# Patient Record
Sex: MALE | Race: WHITE | NOT HISPANIC OR LATINO | RURAL
[De-identification: names, ages, dates, MRNs, and addresses within clinical notes are randomized per-mention and may not be internally consistent; named-entity substitution may affect disease eponyms.]

---

## 2023-11-03 ENCOUNTER — HOSPITAL ENCOUNTER (EMERGENCY)
Facility: HOSPITAL | Age: 63
Discharge: HOME | End: 2023-11-03

## 2023-11-03 VITALS
HEIGHT: 72 IN | OXYGEN SATURATION: 95 % | BODY MASS INDEX: 24.11 KG/M2 | HEART RATE: 63 BPM | WEIGHT: 178 LBS | TEMPERATURE: 98 F | SYSTOLIC BLOOD PRESSURE: 129 MMHG | RESPIRATION RATE: 18 BRPM | DIASTOLIC BLOOD PRESSURE: 82 MMHG

## 2023-11-03 DIAGNOSIS — R05.9 COUGH, UNSPECIFIED TYPE: ICD-10-CM

## 2023-11-03 DIAGNOSIS — J40 BRONCHITIS: Primary | ICD-10-CM

## 2023-11-03 LAB
FLUAV AG UPPER RESP QL IA.RAPID: NEGATIVE
FLUBV AG UPPER RESP QL IA.RAPID: NEGATIVE
SARS-COV-2 RDRP RESP QL NAA+PROBE: NEGATIVE

## 2023-11-03 PROCEDURE — 99284 PR EMERGENCY DEPT VISIT,LEVEL IV: ICD-10-PCS | Mod: ,,,

## 2023-11-03 PROCEDURE — 99284 EMERGENCY DEPT VISIT MOD MDM: CPT | Mod: ,,,

## 2023-11-03 PROCEDURE — 87804 INFLUENZA ASSAY W/OPTIC: CPT

## 2023-11-03 PROCEDURE — 96372 THER/PROPH/DIAG INJ SC/IM: CPT

## 2023-11-03 PROCEDURE — 87635 SARS-COV-2 COVID-19 AMP PRB: CPT

## 2023-11-03 PROCEDURE — 99284 EMERGENCY DEPT VISIT MOD MDM: CPT

## 2023-11-03 PROCEDURE — 63600175 PHARM REV CODE 636 W HCPCS

## 2023-11-03 RX ORDER — CEFTRIAXONE 1 G/1
1 INJECTION, POWDER, FOR SOLUTION INTRAMUSCULAR; INTRAVENOUS
Status: COMPLETED | OUTPATIENT
Start: 2023-11-03 | End: 2023-11-03

## 2023-11-03 RX ORDER — AZITHROMYCIN 250 MG/1
TABLET, FILM COATED ORAL
Qty: 6 TABLET | Refills: 0 | Status: SHIPPED | OUTPATIENT
Start: 2023-11-03 | End: 2023-11-08

## 2023-11-03 RX ORDER — DILTIAZEM HYDROCHLORIDE 180 MG/1
180 CAPSULE, COATED, EXTENDED RELEASE ORAL DAILY
COMMUNITY

## 2023-11-03 RX ORDER — DIGOXIN 125 MCG
125 TABLET ORAL DAILY
COMMUNITY

## 2023-11-03 RX ADMIN — CEFTRIAXONE SODIUM 1 G: 1 INJECTION, POWDER, FOR SOLUTION INTRAMUSCULAR; INTRAVENOUS at 06:11

## 2023-11-03 NOTE — ED PROVIDER NOTES
Encounter Date: 11/3/2023       History     Chief Complaint   Patient presents with    Nasal Congestion    Cough     X 4 days     Patient is a 63-year-old white male who presents to the emergency department with cough and nasal congestion over the past 4 days.  He reports a concern that he may have been exposed to influenza.  He is also a smoker.  He is having a productive cough.  He is tolerating p.o. well.  He does have known COPD but denies any shortness of breath or difficulty breathing.  He has been treating with over-the-counter nasal saline spray and reports that he did have a nosebleed 1-2 days prior.  He is resting comfortably at the time of the exam.  He denies any chest pain, shortness of breath, weakness, or any other complaints at this time.  His vital signs are within normal limits.  He appears in no acute distress.    The history is provided by the patient.     Review of patient's allergies indicates:  No Known Allergies  Past Medical History:   Diagnosis Date    Hypertension      History reviewed. No pertinent surgical history.  History reviewed. No pertinent family history.  Social History     Tobacco Use    Smoking status: Never    Smokeless tobacco: Never   Substance Use Topics    Alcohol use: Yes    Drug use: Never     Review of Systems   Constitutional:  Negative for activity change, appetite change, chills and fever.   HENT:  Positive for congestion. Negative for drooling, ear discharge, ear pain, facial swelling, sinus pressure, sinus pain, sore throat and trouble swallowing.    Respiratory:  Positive for cough (productive). Negative for shortness of breath.    Cardiovascular:  Negative for chest pain and palpitations.   Gastrointestinal:  Negative for abdominal pain, diarrhea, nausea and vomiting.   Genitourinary:  Negative for dysuria and frequency.   Musculoskeletal:  Negative for arthralgias, back pain, myalgias, neck pain and neck stiffness.   Skin:  Negative for color change, pallor and  rash.   Neurological:  Negative for dizziness, seizures, syncope, facial asymmetry, weakness, light-headedness, numbness and headaches.   Psychiatric/Behavioral:  Negative for confusion. The patient is not nervous/anxious.    All other systems reviewed and are negative.      Physical Exam     Initial Vitals [11/03/23 1804]   BP Pulse Resp Temp SpO2   129/82 63 18 98.1 °F (36.7 °C) 95 %      MAP       --         Physical Exam    Nursing note and vitals reviewed.  Constitutional: He appears well-developed and well-nourished. No distress.   HENT:   Head: Normocephalic and atraumatic.   Mouth/Throat: Oropharynx is clear and moist.   Eyes: Conjunctivae and EOM are normal. Pupils are equal, round, and reactive to light.   Neck: Neck supple.   Normal range of motion.  Cardiovascular:  Normal rate, regular rhythm and normal heart sounds.           Pulmonary/Chest: Effort normal. No respiratory distress. He has no decreased breath sounds. He has no wheezes. He has rhonchi (mild and bilateral). He has no rales.   Abdominal: Abdomen is soft. Bowel sounds are normal. He exhibits no distension. There is no abdominal tenderness.   Musculoskeletal:         General: Normal range of motion.      Cervical back: Normal range of motion and neck supple.     Neurological: He is alert and oriented to person, place, and time. He has normal strength. GCS score is 15. GCS eye subscore is 4. GCS verbal subscore is 5. GCS motor subscore is 6.   Skin: Skin is warm and dry. Capillary refill takes less than 2 seconds.   Psychiatric: He has a normal mood and affect. His behavior is normal. Judgment and thought content normal.         Medical Screening Exam   See Full Note    ED Course   Procedures  Labs Reviewed   RAPID INFLUENZA A/B - Normal   SARS-COV-2 RNA AMPLIFICATION, QUAL - Normal    Narrative:     Negative SARS-CoV results should not be used as the sole basis for treatment or patient management decisions; negative results should be  considered in the context of a patient's recent exposures, history and the presene of clinical signs and symptoms consistent with COVID-19.  Negative results should be treated as presumptive and confirmed by molecular assay, if necessary for patient management.          Imaging Results    None          Medications   cefTRIAXone injection 1 g (has no administration in time range)     Medical Decision Making  Patient presents for evaluation of cough and congestion over the past 4 days.  He has a smoker.  He is not in any respiratory distress.  He is afebrile.  History and exam is consistent with bronchitis.  We will treat the patient with Rocephin 1 g IM while in the emergency department and place the patient on a Z-Wilian outpatient.  He was instructed to return to the emergency department for any new or worrisome symptoms.  He was also advised to follow up with his primary care doctor on Monday for a recheck.  He verbalizes understanding and is agreement with this plan.    Amount and/or Complexity of Data Reviewed  Independent Historian:      Details: Patient is a 63-year-old white male who presents to the emergency department with cough and nasal congestion over the past 4 days.  He reports a concern that he may have been exposed to influenza.  He is also a smoker.  He is having a productive cough.  He is tolerating p.o. well.  He does have known COPD but denies any shortness of breath or difficulty breathing.  He has been treating with over-the-counter nasal saline spray and reports that he did have a nosebleed 1-2 days prior.  He is resting comfortably at the time of the exam.  He denies any chest pain, shortness of breath, weakness, or any other complaints at this time.  His vital signs are within normal limits.  He appears in no acute distress.    Risk  Prescription drug management.  Risk Details: The presentation of Harris DASIA Miko is consistent with acute bronchitis. More serious diseases of the lower respiratory  tract were considered but in the absence of clinical or ancillary findings highly suggestive of such, these conditions were considered unlikely. Such diseases include but are not limited to pneumonia, asthma (extrinsic or intrinsic), influenza, retained foreign body, or occupational exposures. Other causes of cough such as GERD, pharyngitis, and sinusitis were also felt to be unlikely.    Upon discharge, Steven Crouch has no evidence of respiratory failure and is comfortable without respiratory distress. Additionally, Steven Crouch has no evidence of airway compromise and is speaking in full/complete sentences without difficulty. Steven Crouch meets outpatient treatment criteria.    Data Reviewed/Counseling: I have reviewed the patient's vital signs, nursing notes, and other relevant ancillary testing/information. I have had a detailed discussion with the patient regarding the historical points, examination findings, and any diagnostic results. I have also discussed the need for outpatient follow-up. I have recommended symptomatic therapy directed at alleviating symptoms as care for acute bronchitis is primarily supportive.  Due to the patient's advanced age, comorbidities, and current smoking status we will treat him with a dose of outpatient Z-Wilian.  It was also encouraged to follow up with his primary care provider on Monday for a recheck and return to the emergency department for any new or worrisome symptoms.  He verbalizes understanding and agreement with this plan.    Steven Crouch has been counseled to return to the Emergency Department if symptoms worsen or if there are any questions or concerns that arise while at home.    Steven Crouch encouraged to practice good infection control procedures to include but not limited to frequent hand washing to lessen likelihood of transmission of this infection.     Dx:  Bronchitis/cough                               Clinical Impression:   Final diagnoses:  [J40]  Bronchitis (Primary)  [R05.9] Cough, unspecified type        ED Disposition Condition    Discharge Stable          ED Prescriptions       Medication Sig Dispense Start Date End Date Auth. Provider    azithromycin (Z-LYUBOV) 250 MG tablet Take 2 tablets by mouth on day 1; Take 1 tablet by mouth on days 2-5 6 tablet 11/3/2023 11/8/2023 Abdoul Reina FNP          Follow-up Information    None          Abdoul Reina FNP  11/03/23 0304

## 2023-11-03 NOTE — DISCHARGE INSTRUCTIONS
Alternate Tylenol and ibuprofen over-the-counter for any pain or fever.    Increase fluids to help thin mucus secretions and maintain proper hydration.    Take all antibiotics even if you feel better.  Arrange for a follow up with your primary care provider in 1-2 days for a recheck.    Return to the emergency department for any new or worrisome symptoms.

## 2023-11-08 NOTE — ADDENDUM NOTE
Encounter addended by: Sanaz Ramírez on: 11/8/2023 6:55 AM   Actions taken: SmartForm saved, Flowsheet accepted

## 2023-12-20 DIAGNOSIS — M25.469 EFFUSION, UNSPECIFIED KNEE: Primary | ICD-10-CM

## 2023-12-26 DIAGNOSIS — M25.561 RIGHT KNEE PAIN, UNSPECIFIED CHRONICITY: Primary | ICD-10-CM

## 2024-01-03 ENCOUNTER — OFFICE VISIT (OUTPATIENT)
Dept: ORTHOPEDICS | Facility: CLINIC | Age: 64
End: 2024-01-03
Payer: OTHER GOVERNMENT

## 2024-01-03 ENCOUNTER — HOSPITAL ENCOUNTER (OUTPATIENT)
Dept: RADIOLOGY | Facility: HOSPITAL | Age: 64
Discharge: HOME | End: 2024-01-03
Attending: ORTHOPAEDIC SURGERY
Payer: OTHER GOVERNMENT

## 2024-01-03 DIAGNOSIS — M25.469 EFFUSION, UNSPECIFIED KNEE: ICD-10-CM

## 2024-01-03 DIAGNOSIS — M25.561 RIGHT KNEE PAIN, UNSPECIFIED CHRONICITY: ICD-10-CM

## 2024-01-03 DIAGNOSIS — M25.561 RIGHT KNEE PAIN, UNSPECIFIED CHRONICITY: Primary | ICD-10-CM

## 2024-01-03 PROCEDURE — 99999PBSHW PR PBB SHADOW TECHNICAL ONLY FILED TO HB: Mod: PBBFAC,,,

## 2024-01-03 PROCEDURE — 20610 DRAIN/INJ JOINT/BURSA W/O US: CPT | Mod: PBBFAC | Performed by: ORTHOPAEDIC SURGERY

## 2024-01-03 PROCEDURE — 99212 OFFICE O/P EST SF 10 MIN: CPT | Mod: PBBFAC | Performed by: ORTHOPAEDIC SURGERY

## 2024-01-03 PROCEDURE — 73562 X-RAY EXAM OF KNEE 3: CPT | Mod: TC,RT

## 2024-01-03 PROCEDURE — 99204 OFFICE O/P NEW MOD 45 MIN: CPT | Mod: S$PBB,25,, | Performed by: ORTHOPAEDIC SURGERY

## 2024-01-03 PROCEDURE — 73562 X-RAY EXAM OF KNEE 3: CPT | Mod: 26,RT,, | Performed by: ORTHOPAEDIC SURGERY

## 2024-01-03 PROCEDURE — 20610 DRAIN/INJ JOINT/BURSA W/O US: CPT | Mod: S$PBB,RT,, | Performed by: ORTHOPAEDIC SURGERY

## 2024-01-03 RX ORDER — BUPIVACAINE HYDROCHLORIDE 2.5 MG/ML
1 INJECTION, SOLUTION INFILTRATION; PERINEURAL
Status: COMPLETED | OUTPATIENT
Start: 2024-01-03 | End: 2024-01-03

## 2024-01-03 RX ORDER — MELOXICAM 15 MG/1
15 TABLET ORAL DAILY
Qty: 30 TABLET | Refills: 2 | Status: SHIPPED | OUTPATIENT
Start: 2024-01-03 | End: 2024-01-08

## 2024-01-03 RX ORDER — TRIAMCINOLONE ACETONIDE 40 MG/ML
40 INJECTION, SUSPENSION INTRA-ARTICULAR; INTRAMUSCULAR
Status: COMPLETED | OUTPATIENT
Start: 2024-01-03 | End: 2024-01-03

## 2024-01-03 RX ADMIN — TRIAMCINOLONE ACETONIDE 40 MG: 40 INJECTION, SUSPENSION INTRA-ARTICULAR; INTRAMUSCULAR at 01:01

## 2024-01-03 RX ADMIN — BUPIVACAINE HYDROCHLORIDE 2.5 MG: 2.5 INJECTION, SOLUTION INFILTRATION; PERINEURAL at 01:01

## 2024-01-03 NOTE — PROGRESS NOTES
CLINIC NOTE       Chief Complaint   Patient presents with    Right Knee - Pain        Setven Crouch is a 63 y.o. male seen today for evaluation of right knee pain.  Symptoms began insidiously.  He was had escalation of symptoms over the past 2 months.  Had an awkward twisting moment after getting off of a 4 rodriguez that exacerbated symptoms involving the anterior compartment.  He has been aware of subpatellar crepitance.  He has not using any oral anti-inflammatory medications.  He has never had intra-articular steroid injection.  [facility]  He was not on any blood thinners.  He resides in [facility]      Past Medical History:   Diagnosis Date    Hypertension      No family history on file.  Current Outpatient Medications on File Prior to Visit   Medication Sig Dispense Refill    digoxin (LANOXIN) 125 mcg tablet Take 125 mcg by mouth once daily.      diltiaZEM (CARDIZEM CD) 180 MG 24 hr capsule Take 180 mg by mouth once daily.       No current facility-administered medications on file prior to visit.       ROS     There were no vitals filed for this visit.    No past surgical history on file.     Review of patient's allergies indicates:  No Known Allergies     Ortho Exam :  Well-developed well-nourished  male no acute distress.  Alert oriented cooperative.  Neck is supple without JVD.  Breathing is regular nonlabored.  Skin is warm dry no lesions seen.  Exam of the right knee shows minimal intra-articular effusion.  Knee range motion is 0-125 degrees of flexion.  There is significant subpatellar crepitance with active knee extension.  Patella appears to track laterally.  Knee ligaments stable clinically.    Radiographic Examination:  Right knee 01/03/2024    Technique:  Three views AP lateral and patellar    Findings:  Bones well mineralized.  There is evidence of tricompartmental DJD with the greatest involvement of the patellofemoral joint.  The patella  was tracking laterally on sunrise view.  There is moderate narrowing of the medial and lateral joint spaces with early marginal osteophyte formation.    Impression:   See Above    Assessment and Plan  There are no problems to display for this patient.  Impression:  Moderately severe tricompartmental DJD-right knee  Plan: The right knee was prepped with Betadine intra-articular steroid injection performed with triamcinolone and Marcaine 1 cc each.  Rx Mobic 15 mg 1 p.o. q.d. p.r.n..  We discussed the option for total knee replacement arthroplasty in the future if symptoms persist at an unacceptable level.  He would need to wait 3-4 months following steroid injection before he will be eligible to schedule.  Jed Gonzalez M.D.

## 2024-01-08 ENCOUNTER — TELEPHONE (OUTPATIENT)
Dept: ORTHOPEDICS | Facility: CLINIC | Age: 64
End: 2024-01-08
Payer: OTHER GOVERNMENT

## 2024-01-08 ENCOUNTER — HOSPITAL ENCOUNTER (EMERGENCY)
Facility: HOSPITAL | Age: 64
Discharge: HOME | End: 2024-01-08
Payer: OTHER GOVERNMENT

## 2024-01-08 VITALS
OXYGEN SATURATION: 99 % | SYSTOLIC BLOOD PRESSURE: 118 MMHG | DIASTOLIC BLOOD PRESSURE: 76 MMHG | RESPIRATION RATE: 16 BRPM | HEIGHT: 71 IN | WEIGHT: 165 LBS | HEART RATE: 70 BPM | TEMPERATURE: 98 F | BODY MASS INDEX: 23.1 KG/M2

## 2024-01-08 DIAGNOSIS — K76.9 CHRONIC LIVER DISEASE AND CIRRHOSIS: Primary | ICD-10-CM

## 2024-01-08 DIAGNOSIS — K74.60 CHRONIC LIVER DISEASE AND CIRRHOSIS: Primary | ICD-10-CM

## 2024-01-08 DIAGNOSIS — K59.00 CONSTIPATION, UNSPECIFIED CONSTIPATION TYPE: ICD-10-CM

## 2024-01-08 DIAGNOSIS — R10.9 ABDOMINAL PAIN: ICD-10-CM

## 2024-01-08 LAB
ALBUMIN SERPL BCP-MCNC: 2.7 G/DL (ref 3.5–5)
ALBUMIN/GLOB SERPL: 0.5 {RATIO}
ALP SERPL-CCNC: 106 U/L (ref 45–115)
ALT SERPL W P-5'-P-CCNC: 62 U/L (ref 16–61)
AMYLASE SERPL-CCNC: 25 U/L (ref 25–115)
ANION GAP SERPL CALCULATED.3IONS-SCNC: 11 MMOL/L (ref 7–16)
AST SERPL W P-5'-P-CCNC: 50 U/L (ref 15–37)
BASOPHILS # BLD AUTO: 0.05 K/UL (ref 0–0.2)
BASOPHILS NFR BLD AUTO: 0.6 % (ref 0–1)
BILIRUB SERPL-MCNC: 2 MG/DL (ref ?–1.2)
BILIRUB UR QL STRIP: NEGATIVE
BUN SERPL-MCNC: 20 MG/DL (ref 7–18)
BUN/CREAT SERPL: 19 (ref 6–20)
CALCIUM SERPL-MCNC: 8.7 MG/DL (ref 8.5–10.1)
CHLORIDE SERPL-SCNC: 105 MMOL/L (ref 98–107)
CLARITY UR: CLEAR
CO2 SERPL-SCNC: 22 MMOL/L (ref 21–32)
COLOR UR: YELLOW
CREAT SERPL-MCNC: 1.05 MG/DL (ref 0.7–1.3)
DIFFERENTIAL METHOD BLD: ABNORMAL
EGFR (NO RACE VARIABLE) (RUSH/TITUS): 79 ML/MIN/1.73M2
EOSINOPHIL # BLD AUTO: 0.24 K/UL (ref 0–0.5)
EOSINOPHIL NFR BLD AUTO: 2.7 % (ref 1–4)
ERYTHROCYTE [DISTWIDTH] IN BLOOD BY AUTOMATED COUNT: 18.4 % (ref 11.5–14.5)
GLOBULIN SER-MCNC: 5.2 G/DL (ref 2–4)
GLUCOSE SERPL-MCNC: 86 MG/DL (ref 74–106)
GLUCOSE UR STRIP-MCNC: NORMAL MG/DL
HCT VFR BLD AUTO: 40.8 % (ref 40–54)
HGB BLD-MCNC: 13.5 G/DL (ref 13.5–18)
IMM GRANULOCYTES # BLD AUTO: 0.01 K/UL (ref 0–0.04)
IMM GRANULOCYTES NFR BLD: 0.1 % (ref 0–0.4)
KETONES UR STRIP-SCNC: NEGATIVE MG/DL
LEUKOCYTE ESTERASE UR QL STRIP: NEGATIVE
LIPASE SERPL-CCNC: 35 U/L (ref 16–77)
LYMPHOCYTES # BLD AUTO: 1.39 K/UL (ref 1–4.8)
LYMPHOCYTES NFR BLD AUTO: 15.9 % (ref 27–41)
MCH RBC QN AUTO: 29 PG (ref 27–31)
MCHC RBC AUTO-ENTMCNC: 33.1 G/DL (ref 32–36)
MCV RBC AUTO: 87.6 FL (ref 80–96)
MONOCYTES # BLD AUTO: 1.49 K/UL (ref 0–0.8)
MONOCYTES NFR BLD AUTO: 17.1 % (ref 2–6)
MPC BLD CALC-MCNC: 10 FL (ref 9.4–12.4)
NEUTROPHILS # BLD AUTO: 5.55 K/UL (ref 1.8–7.7)
NEUTROPHILS NFR BLD AUTO: 63.6 % (ref 53–65)
NITRITE UR QL STRIP: NEGATIVE
NRBC # BLD AUTO: 0 X10E3/UL
NRBC, AUTO (.00): 0 %
PH UR STRIP: 6.5 PH UNITS
PLATELET # BLD AUTO: 170 K/UL (ref 150–400)
POC OCCULT BLOOD STOOL: NEGATIVE
POTASSIUM SERPL-SCNC: 3.9 MMOL/L (ref 3.5–5.1)
PROT SERPL-MCNC: 7.9 G/DL (ref 6.4–8.2)
PROT UR QL STRIP: NEGATIVE
RBC # BLD AUTO: 4.66 M/UL (ref 4.6–6.2)
RBC # UR STRIP: NEGATIVE /UL
SODIUM SERPL-SCNC: 134 MMOL/L (ref 136–145)
SP GR UR STRIP: 1.01
UROBILINOGEN UR STRIP-ACNC: NORMAL MG/DL
WBC # BLD AUTO: 8.73 K/UL (ref 4.5–11)

## 2024-01-08 PROCEDURE — 82272 OCCULT BLD FECES 1-3 TESTS: CPT

## 2024-01-08 PROCEDURE — 80053 COMPREHEN METABOLIC PANEL: CPT | Performed by: NURSE PRACTITIONER

## 2024-01-08 PROCEDURE — 81003 URINALYSIS AUTO W/O SCOPE: CPT | Performed by: NURSE PRACTITIONER

## 2024-01-08 PROCEDURE — 85025 COMPLETE CBC W/AUTO DIFF WBC: CPT | Performed by: NURSE PRACTITIONER

## 2024-01-08 PROCEDURE — 99285 EMERGENCY DEPT VISIT HI MDM: CPT | Mod: 25

## 2024-01-08 PROCEDURE — 82150 ASSAY OF AMYLASE: CPT | Performed by: NURSE PRACTITIONER

## 2024-01-08 PROCEDURE — 99284 EMERGENCY DEPT VISIT MOD MDM: CPT | Mod: ,,, | Performed by: NURSE PRACTITIONER

## 2024-01-08 PROCEDURE — 83690 ASSAY OF LIPASE: CPT | Performed by: NURSE PRACTITIONER

## 2024-01-08 NOTE — DISCHARGE INSTRUCTIONS
Add Miralax 1 capful to daily medications.    Increase water and fiber content in diet.    Follow up with primary care provider in 2 days if symptoms do not improve.   Return to the ER with new or worsening symptoms.

## 2024-01-08 NOTE — TELEPHONE ENCOUNTER
----- Message from Anne-Marie Germain sent at 1/5/2024  2:32 PM CST -----  Pt was given Mobic and he can't take it. Made stomach hurt, threw up and made his sugar high. Wants something else. Call [phone]. Pt has Cirrhosis of liver also. Said his whole gut was hurting from it.

## 2024-01-08 NOTE — TELEPHONE ENCOUNTER
1/8/24 @ 1004 called and spoke with pt. Pt states his stomach has been hurting since he started the mobic and he is having all the side effected listed on the piece of paper that came with the mobic rx. Pt states he is having purple dots on his knees and his appendix hurts. Advised pt to stop taking the mobic and to follow up with his pcp or go to the er about his purple spots on his knees and his appendix pain voiced understanding

## 2024-01-09 ENCOUNTER — TELEPHONE (OUTPATIENT)
Dept: EMERGENCY MEDICINE | Facility: HOSPITAL | Age: 64
End: 2024-01-09
Payer: OTHER GOVERNMENT

## 2024-01-09 NOTE — ED PROVIDER NOTES
Encounter Date: 1/8/2024       History     Chief Complaint   Patient presents with    Abdominal Pain     RLQ pain since Wednesday. Hx of cirrhosis        Patient presents to the ER with complaint of abdominal pain.  Patient reports his symptoms started last Wednesday when he took meloxicam that was prescribed to him by his orthopedic provider.  He states when he took the 1st meloxicam he was started having abdominal pain he was started seeing blood in his stool.  He was started having purple spots on his knees.  All the symptoms have since resolved but today he started having pain in the right lower quadrant of his abdomen.  He denies nausea or vomiting.  No diarrhea.  Reports normal bowel movements now without blood or mucus.  Patient has history of alcohol-induced cirrhosis.    The history is provided by the patient. No  was used.     Review of patient's allergies indicates:   Allergen Reactions    Mobic [meloxicam] Other (See Comments)     Stomach pain      Past Medical History:   Diagnosis Date    A-fib     Hypertension     Unspecified cirrhosis of liver      History reviewed. No pertinent surgical history.  History reviewed. No pertinent family history.  Social History     Tobacco Use    Smoking status: Never    Smokeless tobacco: Never   Substance Use Topics    Alcohol use: Yes     Alcohol/week: 6.0 standard drinks of alcohol     Types: 6 Cans of beer per week    Drug use: Never     Review of Systems    Physical Exam     Initial Vitals [01/08/24 1247]   BP Pulse Resp Temp SpO2   112/74 72 17 97.3 °F (36.3 °C) 97 %      MAP       --         Physical Exam    Medical Screening Exam   See Full Note    ED Course   Procedures  Labs Reviewed   COMPREHENSIVE METABOLIC PANEL - Abnormal; Notable for the following components:       Result Value    Sodium 134 (*)     BUN 20 (*)     Albumin 2.7 (*)     Globulin 5.2 (*)     Bilirubin, Total 2.0 (*)     ALT 62 (*)     AST 50 (*)     All other components  within normal limits   CBC WITH DIFFERENTIAL - Abnormal; Notable for the following components:    RDW 18.4 (*)     Lymphocytes % 15.9 (*)     Monocytes % 17.1 (*)     Monocytes, Absolute 1.49 (*)     All other components within normal limits   AMYLASE - Normal   LIPASE - Normal   CBC W/ AUTO DIFFERENTIAL    Narrative:     The following orders were created for panel order CBC auto differential.  Procedure                               Abnormality         Status                     ---------                               -----------         ------                     CBC with Differential[[id number]]       Abnormal            Final result                 Please view results for these tests on the individual orders.   URINALYSIS, REFLEX TO URINE CULTURE   POCT OCCULT BLOOD (STOOL)          Imaging Results              US Abdomen Limited_Gallbladder (Final result)  Result time 01/08/24 15:36:10      Final result by Rod Etienne DO (01/08/24 15:36:10)                   Impression:      Cirrhotic liver.  The liver is normal in size.  Retrograde portal vein flow.  No focal liver lesion.    Ultrasound images captured and stored.      Electronically signed by: Rod Etienne  Date:    01/08/2024  Time:    15:36               Narrative:    EXAMINATION:  US ABDOMEN LIMITED_GALLBLADDER    CLINICAL HISTORY:  elevated bilirubin, abdominal pain;    TECHNIQUE:  Multiplane limited abdominal ultrasound with grayscale and color Doppler.    COMPARISON:  1/8/23    FINDINGS:  Cirrhotic liver.  The liver is normal in size.  Retrograde portal vein flow.  No focal liver lesion.    There is no evidence for cholelithiasis, gallbladder wall thickening, or pericholecystic fluid.    The common bile duct measures 0.7 cm.    The right kidney measures 11 cm.  The right kidney is normal.    The pancreas as visualized is unremarkable in appearance.    The visualized aorta and IVC are unremarkable in appearance.                                        X-Ray Abdomen Flat And Erect (Final result)  Result time 01/08/24 13:32:34      Final result by Jorge Angeles II, MD (01/08/24 13:32:34)                   Impression:      No evidence of acute process demonstrated      Electronically signed by: Jorge Angeles  Date:    01/08/2024  Time:    13:32               Narrative:    EXAMINATION:  XR ABDOMEN FLAT AND ERECT    CLINICAL HISTORY:  Abdominal pain    COMPARISON:  None available    TECHNIQUE:  XR ABDOMEN FLAT AND ERECT    FINDINGS:  No free fluid or free air seen.  The bowel gas pattern appears within normal limits.  No abnormal calcifications are present.  No other abnormality is identified.                                       Medications - No data to display  Medical Decision Making   Patient presents to the ER with complaint of abdominal pain.  Patient reports his symptoms started last Wednesday when he took meloxicam that was prescribed to him by his orthopedic provider.  He states when he took the 1st meloxicam he was started having abdominal pain he was started seeing blood in his stool.  He was started having purple spots on his knees.  All the symptoms have since resolved but today he started having pain in the right lower quadrant of his abdomen.  He denies nausea or vomiting.  No diarrhea.  Reports normal bowel movements now without blood or mucus.  Patient has history of alcohol-induced cirrhosis.      Amount and/or Complexity of Data Reviewed  Labs: ordered. Decision-making details documented in ED Course.     Details: Elevated bilirubin  Radiology: ordered. Decision-making details documented in ED Course.  Discussion of management or test interpretation with external provider(s):  Patient was discharged home with diagnosis,  chronic liver disease and cirrhosis.  Along with constipation.  He was given instructions to add MiraLax to daily medications.  He was told to follow up with his primary care provider in 2 days for recheck.  He  was told to return to the ER with new or worsening symptoms.    Dictation #1  MRN:[id number]  [facility]:[id number]                                   Clinical Impression:   Final diagnoses:  [R10.9] Abdominal pain  [K74.60, K76.9] Chronic liver disease and cirrhosis (Primary)  [K59.00] Constipation, unspecified constipation type        ED Disposition Condition    Discharge Stable          ED Prescriptions    None       Follow-up Information       Follow up With Specialties Details Why Contact Info    primary care provider  Schedule an appointment as soon as possible for a visit in 2 days If symptoms worsen              Estefanía Vasquez, FNP  01/08/24 5073

## 2024-05-13 ENCOUNTER — HOSPITAL ENCOUNTER (INPATIENT)
Facility: HOSPITAL | Age: 64
LOS: 3 days | Discharge: NURSING, REHAB OR LONG-TERM CARE | DRG: 280 | End: 2024-05-17
Attending: EMERGENCY MEDICINE | Admitting: INTERNAL MEDICINE
Payer: MEDICARE

## 2024-05-13 DIAGNOSIS — R79.89 ELEVATED LFTS: ICD-10-CM

## 2024-05-13 DIAGNOSIS — I49.5 SINUS BRADY-TACHY SYNDROME: ICD-10-CM

## 2024-05-13 DIAGNOSIS — I27.20 PULMONARY HTN: ICD-10-CM

## 2024-05-13 DIAGNOSIS — N17.9 AKI (ACUTE KIDNEY INJURY): ICD-10-CM

## 2024-05-13 DIAGNOSIS — I42.8 NICM (NONISCHEMIC CARDIOMYOPATHY): ICD-10-CM

## 2024-05-13 DIAGNOSIS — I21.A1 TYPE 2 MYOCARDIAL INFARCTION: ICD-10-CM

## 2024-05-13 DIAGNOSIS — R55 SYNCOPAL EPISODES: ICD-10-CM

## 2024-05-13 DIAGNOSIS — F10.232 ALCOHOL DEPENDENCE WITH WITHDRAWAL WITH PERCEPTUAL DISTURBANCE: ICD-10-CM

## 2024-05-13 DIAGNOSIS — I21.4 NSTEMI (NON-ST ELEVATED MYOCARDIAL INFARCTION): ICD-10-CM

## 2024-05-13 DIAGNOSIS — R07.89 OTHER CHEST PAIN: ICD-10-CM

## 2024-05-13 DIAGNOSIS — R56.9 SEIZURE AS LATE EFFECT OF CEREBROVASCULAR ACCIDENT (CVA): ICD-10-CM

## 2024-05-13 DIAGNOSIS — K70.30 ALCOHOLIC CIRRHOSIS OF LIVER WITHOUT ASCITES: ICD-10-CM

## 2024-05-13 DIAGNOSIS — R79.89 ELEVATED TROPONIN I LEVEL: ICD-10-CM

## 2024-05-13 DIAGNOSIS — I69.398 SEIZURE AS LATE EFFECT OF CEREBROVASCULAR ACCIDENT (CVA): ICD-10-CM

## 2024-05-13 DIAGNOSIS — R41.82 ALTERED MENTAL STATUS, UNSPECIFIED ALTERED MENTAL STATUS TYPE: ICD-10-CM

## 2024-05-13 DIAGNOSIS — J96.01 ACUTE RESPIRATORY FAILURE WITH HYPOXIA: ICD-10-CM

## 2024-05-13 DIAGNOSIS — I27.20 SEVERE PULMONARY HYPERTENSION: ICD-10-CM

## 2024-05-13 DIAGNOSIS — R55 SYNCOPE, UNSPECIFIED SYNCOPE TYPE: Primary | ICD-10-CM

## 2024-05-13 DIAGNOSIS — R07.9 CHEST PAIN: ICD-10-CM

## 2024-05-13 DIAGNOSIS — R79.89 ELEVATED TROPONIN: ICD-10-CM

## 2024-05-13 DIAGNOSIS — I48.0 PAROXYSMAL ATRIAL FIBRILLATION: ICD-10-CM

## 2024-05-13 DIAGNOSIS — R41.82 ALTERED MENTAL STATUS: ICD-10-CM

## 2024-05-13 LAB
ALBUMIN SERPL BCP-MCNC: 3 G/DL (ref 3.5–5)
ALBUMIN/GLOB SERPL: 0.7 {RATIO}
ALP SERPL-CCNC: 95 U/L (ref 45–115)
ALT SERPL W P-5'-P-CCNC: 28 U/L (ref 16–61)
ANION GAP SERPL CALCULATED.3IONS-SCNC: 16 MMOL/L (ref 7–16)
AST SERPL W P-5'-P-CCNC: 49 U/L (ref 15–37)
BASOPHILS # BLD AUTO: 0.05 K/UL (ref 0–0.2)
BASOPHILS NFR BLD AUTO: 0.6 % (ref 0–1)
BILIRUB SERPL-MCNC: 1.3 MG/DL (ref ?–1.2)
BUN SERPL-MCNC: 22 MG/DL (ref 7–18)
BUN/CREAT SERPL: 16 (ref 6–20)
CALCIUM SERPL-MCNC: 8.8 MG/DL (ref 8.5–10.1)
CHLORIDE SERPL-SCNC: 107 MMOL/L (ref 98–107)
CO2 SERPL-SCNC: 19 MMOL/L (ref 21–32)
CREAT SERPL-MCNC: 1.38 MG/DL (ref 0.7–1.3)
DIFFERENTIAL METHOD BLD: ABNORMAL
EGFR (NO RACE VARIABLE) (RUSH/TITUS): 57 ML/MIN/1.73M2
EOSINOPHIL # BLD AUTO: 0.08 K/UL (ref 0–0.5)
EOSINOPHIL NFR BLD AUTO: 1 % (ref 1–4)
ERYTHROCYTE [DISTWIDTH] IN BLOOD BY AUTOMATED COUNT: 17.2 % (ref 11.5–14.5)
ETHANOL SERPL-MCNC: 8 MG/DL
GLOBULIN SER-MCNC: 4.3 G/DL (ref 2–4)
GLUCOSE SERPL-MCNC: 91 MG/DL (ref 74–106)
HCO3 UR-SCNC: 16.9 MMOL/L (ref 21–28)
HCT VFR BLD AUTO: 37.7 % (ref 40–54)
HCT VFR BLD CALC: 38 % (ref 35–51)
HGB BLD-MCNC: 11.8 G/DL (ref 13.5–18)
IMM GRANULOCYTES # BLD AUTO: 0.06 K/UL (ref 0–0.04)
IMM GRANULOCYTES NFR BLD: 0.8 % (ref 0–0.4)
LDH SERPL L TO P-CCNC: 3.1 MMOL/L (ref 0.3–1.2)
LYMPHOCYTES # BLD AUTO: 0.87 K/UL (ref 1–4.8)
LYMPHOCYTES NFR BLD AUTO: 11.3 % (ref 27–41)
MAGNESIUM SERPL-MCNC: 2 MG/DL (ref 1.7–2.3)
MCH RBC QN AUTO: 28.9 PG (ref 27–31)
MCHC RBC AUTO-ENTMCNC: 31.3 G/DL (ref 32–36)
MCV RBC AUTO: 92.4 FL (ref 80–96)
MONOCYTES # BLD AUTO: 0.84 K/UL (ref 0–0.8)
MONOCYTES NFR BLD AUTO: 10.9 % (ref 2–6)
MPC BLD CALC-MCNC: 10.8 FL (ref 9.4–12.4)
NEUTROPHILS # BLD AUTO: 5.81 K/UL (ref 1.8–7.7)
NEUTROPHILS NFR BLD AUTO: 75.4 % (ref 53–65)
NRBC # BLD AUTO: 0 X10E3/UL
NRBC, AUTO (.00): 0 %
NT-PROBNP SERPL-MCNC: 7563 PG/ML (ref 1–125)
PCO2 BLDA: 28 MMHG (ref 35–48)
PH SMN: 7.39 [PH] (ref 7.35–7.45)
PLATELET # BLD AUTO: 104 K/UL (ref 150–400)
PO2 BLDA: 59 MMHG (ref 83–108)
POC BASE EXCESS: -6.8 MMOL/L (ref -2–3)
POC CO2: 17.8 MMOL/L
POC IONIZED CALCIUM: 1.13 MMOL/L (ref 1.15–1.35)
POC SATURATED O2: 90 % (ref 95–98)
POCT GLUCOSE: 91 MG/DL (ref 60–95)
POTASSIUM BLD-SCNC: 4.4 MMOL/L (ref 3.4–4.5)
POTASSIUM SERPL-SCNC: 4.4 MMOL/L (ref 3.5–5.1)
PROT SERPL-MCNC: 7.3 G/DL (ref 6.4–8.2)
RBC # BLD AUTO: 4.08 M/UL (ref 4.6–6.2)
SODIUM BLD-SCNC: 134 MMOL/L (ref 136–145)
SODIUM SERPL-SCNC: 138 MMOL/L (ref 136–145)
TROPONIN I SERPL DL<=0.01 NG/ML-MCNC: 1846 PG/ML
TROPONIN I SERPL DL<=0.01 NG/ML-MCNC: 403.5 PG/ML
WBC # BLD AUTO: 7.71 K/UL (ref 4.5–11)

## 2024-05-13 PROCEDURE — 83605 ASSAY OF LACTIC ACID: CPT

## 2024-05-13 PROCEDURE — 85014 HEMATOCRIT: CPT

## 2024-05-13 PROCEDURE — 93005 ELECTROCARDIOGRAM TRACING: CPT

## 2024-05-13 PROCEDURE — 83735 ASSAY OF MAGNESIUM: CPT | Performed by: EMERGENCY MEDICINE

## 2024-05-13 PROCEDURE — 93010 ELECTROCARDIOGRAM REPORT: CPT | Mod: ,,, | Performed by: HOSPITALIST

## 2024-05-13 PROCEDURE — 25000003 PHARM REV CODE 250

## 2024-05-13 PROCEDURE — 82077 ASSAY SPEC XCP UR&BREATH IA: CPT | Performed by: EMERGENCY MEDICINE

## 2024-05-13 PROCEDURE — 82330 ASSAY OF CALCIUM: CPT

## 2024-05-13 PROCEDURE — 36415 COLL VENOUS BLD VENIPUNCTURE: CPT | Performed by: EMERGENCY MEDICINE

## 2024-05-13 PROCEDURE — 82947 ASSAY GLUCOSE BLOOD QUANT: CPT

## 2024-05-13 PROCEDURE — 84484 ASSAY OF TROPONIN QUANT: CPT | Performed by: EMERGENCY MEDICINE

## 2024-05-13 PROCEDURE — 83880 ASSAY OF NATRIURETIC PEPTIDE: CPT | Performed by: EMERGENCY MEDICINE

## 2024-05-13 PROCEDURE — 84132 ASSAY OF SERUM POTASSIUM: CPT

## 2024-05-13 PROCEDURE — 80053 COMPREHEN METABOLIC PANEL: CPT | Performed by: EMERGENCY MEDICINE

## 2024-05-13 PROCEDURE — 82803 BLOOD GASES ANY COMBINATION: CPT

## 2024-05-13 PROCEDURE — 99223 1ST HOSP IP/OBS HIGH 75: CPT | Mod: ,,, | Performed by: INTERNAL MEDICINE

## 2024-05-13 PROCEDURE — 85025 COMPLETE CBC W/AUTO DIFF WBC: CPT | Performed by: EMERGENCY MEDICINE

## 2024-05-13 PROCEDURE — 84295 ASSAY OF SERUM SODIUM: CPT

## 2024-05-13 PROCEDURE — 99285 EMERGENCY DEPT VISIT HI MDM: CPT | Mod: 25

## 2024-05-13 PROCEDURE — 96360 HYDRATION IV INFUSION INIT: CPT

## 2024-05-13 PROCEDURE — 99285 EMERGENCY DEPT VISIT HI MDM: CPT | Mod: ,,, | Performed by: EMERGENCY MEDICINE

## 2024-05-13 RX ORDER — SODIUM CHLORIDE 9 MG/ML
INJECTION, SOLUTION INTRAVENOUS
Status: COMPLETED
Start: 2024-05-13 | End: 2024-05-13

## 2024-05-13 RX ADMIN — SODIUM CHLORIDE 1000 ML: 9 INJECTION, SOLUTION INTRAVENOUS at 08:05

## 2024-05-13 NOTE — Clinical Note
Pt taken to 554 with intent to do procedure tomorrow with anesthesia. Explained to pt and friend. Pt care given to WILMAR Stone. Questions asked and answered. Verbalized understanding.

## 2024-05-13 NOTE — ED PROVIDER NOTES
Encounter Date: 5/13/2024    SCRIBE #1 NOTE: I, Radha Bedolla, am scribing for, and in the presence of,  Oziel Thacker MD. I have scribed the entire note.       History     Chief Complaint   Patient presents with    Altered Mental Status     This is a 63 y/o male,who presents to the ED via EMS for evaluation. EMS states the pt was found on the porch after he had what appears to be syncopal episode. EMS states he has no family here and he does not know if he takes medications. EMS states the pt had slurred speech upon arrival to the scene but now this has appeared to clear up.  He states he remembers he passed out. There is no hx of fever,chills, nausea or vomiting. EMS states he was hypotensive en route and received one liter of fluids. There are no other complaints/pain in the ED at this time. He has a known hx of HTN and Afib. He states he smokes little and drinks alcohol little.     The history is provided by the patient and the EMS personnel. No  was used.     Review of patient's allergies indicates:   Allergen Reactions    Mobic [meloxicam] Other (See Comments)     Stomach pain      Past Medical History:   Diagnosis Date    A-fib     Hypertension     Unspecified cirrhosis of liver      Past Surgical History:   Procedure Laterality Date    CATHETERIZATION OF BOTH LEFT AND RIGHT HEART N/A 5/15/2024    Procedure: CATHETERIZATION, HEART, BOTH LEFT AND RIGHT;  Surgeon: Jason Dean MD;  [facility]: [facility] CATH LAB;  Service: Cardiology;  Laterality: N/A;     No family history on file.  Social History     Tobacco Use    Smoking status: Never    Smokeless tobacco: Never   Substance Use Topics    Alcohol use: Yes     Alcohol/week: 6.0 standard drinks of alcohol     Types: 6 Cans of beer per week    Drug use: Never     Review of Systems   Unable to perform ROS: Mental status change       Physical Exam     Initial Vitals   BP Pulse Resp Temp SpO2   05/13/24 1909 05/13/24 1909 05/13/24  1916 05/13/24 2003 05/13/24 1909   (!) 83/60 105 20 97.4 °F (36.3 °C) (!) 84 %      MAP       --                Physical Exam    Nursing note and vitals reviewed.  Constitutional: He appears well-developed and well-nourished.   HENT:   Head: Normocephalic and atraumatic.   Eyes: Conjunctivae and EOM are normal. Pupils are equal, round, and reactive to light.   Neck: Neck supple.   Normal range of motion.  Cardiovascular:  Normal rate, regular rhythm, normal heart sounds and intact distal pulses.           Pulmonary/Chest: Breath sounds normal.   Abdominal: Abdomen is soft. Bowel sounds are normal.   Musculoskeletal:         General: Normal range of motion.      Cervical back: Normal range of motion and neck supple.     Neurological: He is alert and oriented to person, place, and time. He has normal strength.   Skin: Skin is warm and dry. Capillary refill takes less than 2 seconds.   Psychiatric: He has a normal mood and affect. Thought content normal.         ED Course   Procedures  Labs Reviewed   COMPREHENSIVE METABOLIC PANEL - Abnormal; Notable for the following components:       Result Value    CO2 19 (*)     BUN 22 (*)     Creatinine 1.38 (*)     Albumin 3.0 (*)     Globulin 4.3 (*)     Bilirubin, Total 1.3 (*)     AST 49 (*)     eGFR 57 (*)     All other components within normal limits   NT-PRO NATRIURETIC PEPTIDE - Abnormal; Notable for the following components:    ProBNP 7,563 (*)     All other components within normal limits   URINALYSIS, REFLEX TO URINE CULTURE - Abnormal; Notable for the following components:    Protein,  (*)     Glucose, UA 50 (*)     Urobilinogen, UA 2 (*)     Blood, UA Small (*)     All other components within normal limits   TROPONIN I - Abnormal; Notable for the following components:    Troponin I High Sensitivity 403.5 (*)     All other components within normal limits   CBC WITH DIFFERENTIAL - Abnormal; Notable for the following components:    RBC 4.08 (*)     Hemoglobin 11.8  (*)     Hematocrit 37.7 (*)     MCHC 31.3 (*)     RDW 17.2 (*)     Platelet Count 104 (*)     Neutrophils % 75.4 (*)     Lymphocytes % 11.3 (*)     Monocytes % 10.9 (*)     Immature Granulocytes % 0.8 (*)     Lymphocytes, Absolute 0.87 (*)     Monocytes, Absolute 0.84 (*)     Immature Granulocytes, Absolute 0.06 (*)     All other components within normal limits   TROPONIN I - Abnormal; Notable for the following components:    Troponin I High Sensitivity 1,846.0 (*)     All other components within normal limits   DIGOXIN LEVEL - Abnormal; Notable for the following components:    Digoxin 0.4 (*)     All other components within normal limits   URINALYSIS, MICROSCOPIC - Abnormal; Notable for the following components:    WBC, UA 18 (*)     RBC, UA 8 (*)     Bacteria, UA Occasional (*)     Squamous Epithelial Cells, UA Occasional (*)     Hyaline Casts, UA 2-5 (*)     Mucous Occasional (*)     All other components within normal limits   PROTIME-INR - Abnormal; Notable for the following components:    PT 17.7 (*)     All other components within normal limits   TROPONIN I - Abnormal; Notable for the following components:    Troponin I High Sensitivity 4,287.0 (*)     All other components within normal limits   CBC WITH DIFFERENTIAL - Abnormal; Notable for the following components:    RBC 3.92 (*)     Hemoglobin 11.2 (*)     Hematocrit 35.7 (*)     MCHC 31.4 (*)     RDW 17.2 (*)     Platelet Count 101 (*)     Neutrophils % 68.8 (*)     Lymphocytes % 17.6 (*)     Monocytes % 12.8 (*)     Eosinophils % 0.2 (*)     Monocytes, Absolute 0.82 (*)     All other components within normal limits   COMPREHENSIVE METABOLIC PANEL - Abnormal; Notable for the following components:    Chloride 111 (*)     CO2 20 (*)     Glucose 114 (*)     BUN 27 (*)     Creatinine 1.65 (*)     Albumin 2.9 (*)     Globulin 4.1 (*)     Bilirubin, Total 1.5 (*)     AST 79 (*)     eGFR 46 (*)     All other components within normal limits   APTT - Abnormal;  Notable for the following components:    .5 (*)     All other components within normal limits   DRUG SCREEN, URINE (BEAKER) - Normal   MAGNESIUM - Normal   ALCOHOL,MEDICAL (ETHANOL) - Normal   APTT - Normal   SARS-COV-2 RNA AMPLIFICATION, QUAL - Normal    Narrative:     Negative SARS-CoV results should not be used as the sole basis for treatment or patient management decisions; negative results should be considered in the context of a patient's recent exposures, history and the presene of clinical signs and symptoms consistent with COVID-19.  Negative results should be treated as presumptive and confirmed by molecular assay, if necessary for patient management.   CBC W/ AUTO DIFFERENTIAL    Narrative:     The following orders were created for panel order CBC auto differential.  Procedure                               Abnormality         Status                     ---------                               -----------         ------                     CBC with Differential[[id number]]       Abnormal            Final result                 Please view results for these tests on the individual orders.   CBC W/ AUTO DIFFERENTIAL    Narrative:     The following orders were created for panel order CBC auto differential.  Procedure                               Abnormality         Status                     ---------                               -----------         ------                     CBC with Differential[[id number]]       Abnormal            Final result                 Please view results for these tests on the individual orders.   EXTRA TUBES    Narrative:     The following orders were created for panel order EXTRA TUBES.  Procedure                               Abnormality         Status                     ---------                               -----------         ------                     Light Blue Top Hold[[id number]]                             In process                 Gold Top Hold[[id number]]                                    In process                   Please view results for these tests on the individual orders.   LIGHT BLUE TOP HOLD   GOLD TOP HOLD        ECG Results              EKG 12-lead (Final result)        Collection Time Result Time QRS Duration [facility] QTC Calculation    05/14/24 06:35:17 05/21/24 23:37:41 128 446                     Final result by Interface, Lab In [facility] (05/21/24 23:37:48)                   Narrative:    Test Reason : R07.9,    Vent. Rate : 076 BPM     Atrial Rate : 000 BPM     P-R Int : 184 ms          QRS Dur : 128 ms      QT Int : 418 ms       P-R-T Axes : 075 139 -44 degrees     QTc Int : 446 ms    Sinus rhythm  Marked right axis deviation  Right bundle branch block  Inferior/lateral ST-T abnormality may be due to myocardial ischemia  Abnormal ECG    Confirmed by Alan Flowers MD ([id number]) on 5/21/2024 11:37:38 PM    Referred By: [email or web address]   SELF           Confirmed By:Alan Flowers MD                                     EKG 12-lead (Final result)        Collection Time Result Time QRS Duration OHS QTC Calculation    05/14/24 00:34:49 05/21/24 23:37:49 130 442                     Final result by Interface, Lab In [facility] (05/21/24 23:37:56)                   Narrative:    Test Reason : R07.9,    Vent. Rate : 094 BPM     Atrial Rate : 000 BPM     P-R Int : 160 ms          QRS Dur : 130 ms      QT Int : 382 ms       P-R-T Axes : 059 155 -16 degrees     QTc Int : 442 ms    Sinus rhythm  Marked right axis deviation  Right ventricular hypertrophy  Widespread ST-T abnormality may be due to the hypertrophy and/or ischemia  Abnormal ECG    Confirmed by Alan Flowers MD ([id number]) on 5/21/2024 11:37:47 PM    Referred By: [email or web address]   SELF           Confirmed By:Alan Flowers MD                                     EKG 12-lead (Final result)        Collection Time Result Time QRS Duration [facility]HS QTC Calculation    05/13/24 19:09:49 05/21/24 23:38:12 128 427                     Final  result by Interface, Lab In [facility] (05/21/24 23:38:17)                   Narrative:    Test Reason : R41.82,    Vent. Rate : 104 BPM     Atrial Rate : 000 BPM     P-R Int : 192 ms          QRS Dur : 128 ms      QT Int : 350 ms       P-R-T Axes : 062 141 -18 degrees     QTc Int : 427 ms    Sinus tachycardia  Consider left atrial abnormality  Marked right axis deviation  Right bundle branch block  Inferior/lateral ST-T abnormality may be due to myocardial ischemia  Abnormal ECG    Confirmed by Alan Flowers MD ([id number]) on 5/21/2024 11:38:07 PM    Referred By: [email or web address]   SELF           Confirmed By:Alan Flowers MD                                  Imaging Results              CTA Chest Non-Coronary (PE Studies) (Final result)  Result time 05/14/24 12:12:52      Final result by Jorge Angeles II, MD (05/14/24 12:12:52)                   Impression:      No evidence of pulmonary thromboembolism or other acute process demonstrated.  Hepatic cirrhosis with collateral vessels seen in the right upper abdomen..      Electronically signed by: Jorge Angeles  Date:    05/14/2024  Time:    12:12               Narrative:    EXAMINATION:  CTA CHEST NON CORONARY (PE STUDIES)    CLINICAL HISTORY:  Pulmonary embolism (PE) suspected, high prob;    TECHNIQUE:  Axial CT imaging of the chest is performed with intravenous contrast. Contrast dose is 100 cc Isovue 370.    CT dose reduction technique used - Dose Rite and tube current modulation.    COMPARISON:  None available    FINDINGS:  No thrombus or other abnormality is identified in the pulmonary arteries or veins.  The pulmonary vessel caliber is within normal limits.  The heart, mediastinum and great vessels appear within normal limits.    Lung parenchyma shows no evidence of airspace disease or abnormal density.  No effusion or pneumothorax is present.  Cirrhotic changes of the liver with collateral vessels seen in the upper abdomen.                                       US  Carotid Bilateral (Final result)  Result time 05/14/24 07:56:09      Final result by Edinson Wong DO (05/14/24 07:56:09)                   Impression:      No convincing sonographic evidence of significant (50% or greater) narrowing of either internal carotid artery.    Indirect NASCET criteria utilized.    Point of Service: [facility]      Electronically signed by: Edinson Wong  Date:    05/14/2024  Time:    07:56               Narrative:    EXAMINATION:  US CAROTID BILATERAL    CLINICAL HISTORY:  syncope;    COMPARISON:  None.    TECHNIQUE:  Multiple longitudinal and transverse real-time sonographic images of the bilateral carotid arterial systems are obtained with grayscale, spectral, and color Doppler analysis.    FINDINGS:  Right:    Peak systolic flow velocities (centimeters per second)    ICA/CCA ratio: 1.09    Common carotid artery: 41    Proximal ICA: 27    Mid ICA: 31    Distal ICA: 44    Left:    Peak systolic flow velocities (centimeters per second)    ICA/CCA ratio: 1.07    Common carotid artery: 45    Proximal ICA: 32    Mid ICA: 40    Distal ICA: 48    Grayscale imaging demonstrates atherosclerotic plaque formation.  Antegrade flow noted within the bilateral vertebral arteries.                                         X-Ray Chest AP Portable (Final result)  Result time 05/13/24 19:21:21      Final result by Edinson Wong DO (05/13/24 19:21:21)                   Impression:      Mild cardiomegaly.  No esha pulmonary edema or focal consolidating pneumonia.    Point of Service: [facility]      Electronically signed by: Edinson Wong  Date:    05/13/2024  Time:    19:21               Narrative:    EXAMINATION:  XR CHEST AP PORTABLE    CLINICAL HISTORY:  Altered mental status, unspecified    COMPARISON:  None    TECHNIQUE:  Frontal view/views of the chest.    FINDINGS:  Mild cardiomegaly.  Chronic change of the lungs without focal consolidation, pleural effusion, or  pneumothorax.  Visualized osseous and surrounding soft tissue structures demonstrate no acute abnormality.                                       CT Head Without Contrast (Final result)  Result time 05/13/24 19:32:01      Final result by Edinson Wong DO (05/13/24 19:32:01)                   Impression:      No convincing imaging evidence of acute intracranial abnormality.    The CT exam was performed using one or more of the following dose    reduction techniques- Automated exposure control, adjustment of the mA    and/or kV according to patient size, and/or use of iterative    reconstructed technique.    Point of Service: [facility]      Electronically signed by: Edinson Wong  Date:    05/13/2024  Time:    19:32               Narrative:    EXAMINATION:  CT HEAD WITHOUT CONTRAST    CLINICAL HISTORY:  Mental status change, unknown cause;    COMPARISON:  None    TECHNIQUE:  Multiple axial tomographic images of the brain were obtained without the use of intravenous contrast.    FINDINGS:  Midline structures are nondisplaced.  No convincing evidence of acute intracranial hemorrhage.  No convincing evidence of hydrocephalus.  Visualized paranasal sinuses and mastoid air cells are predominantly clear.                                       Medications   sodium chloride 0.9% bolus 1,000 mL 1,000 mL (0 mLs Intravenous Stopped 5/13/24 2137)   aspirin tablet 325 mg (325 mg Oral Given 5/14/24 0028)   heparin 25,000 units in dextrose 5% (100 units/ml) IV bolus from bag LOW INTENSITY nomogram - RUSH (4,000 Units Intravenous Bolus from Bag 5/14/24 0216)   sodium chloride 0.9% bolus 500 mL 500 mL (0 mLs Intravenous Stopped 5/14/24 0728)   ticagrelor tablet 180 mg (180 mg Oral Given 5/14/24 1053)   iopamidoL (ISOVUE-370) injection 100 mL (100 mLs Intravenous Given 5/14/24 1204)   thiamine (B-1) 250 mg in dextrose 5 % (D5W) 100 mL IVPB (0 mg Intravenous Stopped 5/17/24 1027)   tuberculin injection 5 Units (5 Units  Intradermal Given 5/17/24 1159)     Medical Decision Making  63 Y/O MALE WITH HYPOTENSION AND SYNCOPE.      DDX:  METABOLIC VS CARDIAC VS INFECTIOUS VS OTHER    ADMIT    Amount and/or Complexity of Data Reviewed  Labs: ordered. Decision-making details documented in ED Course.  Radiology: ordered. Decision-making details documented in ED Course.  ECG/medicine tests: ordered. Decision-making details documented in ED Course.  Discussion of management or test interpretation with external provider(s): DISCUSSED WITH ADMITTING PHYSICIAN.      Risk  OTC drugs.  Decision regarding hospitalization.              Attending Attestation:           Physician Attestation for Scribe:  Physician Attestation Statement for Scribe #1: I, Azeem Thacker MD, reviewed documentation, as scribed by Radha Bedolla in my presence, and it is both accurate and complete.             ED Course as of 06/05/24 1857   Mon May 13, 2024   1936 CT Head without contrast:   No convincing imaging evidence of acute intracranial abnormality. [BW]   1936 Xray Chest AP Portable:   Mild cardiomegaly.  No esha pulmonary edema or focal consolidating pneumonia. [BW]      ED Course User Index  [BW] Radha Bedolla                           Clinical Impression:  Final diagnoses:  [R41.82] Altered mental status  [R55] Syncopal episodes          ED Disposition Condition    Admit                 Azeem Thacker MD  06/05/24 1901

## 2024-05-13 NOTE — Clinical Note
Diagnosis: Syncopal episodes [206000]   Future Attending Provider: PENG STARK [[id number]]   Special Needs:: No Special Needs [1]

## 2024-05-14 PROBLEM — R55 SYNCOPAL EPISODES: Status: ACTIVE | Noted: 2024-05-14

## 2024-05-14 PROBLEM — J96.01 ACUTE RESPIRATORY FAILURE WITH HYPOXIA: Status: ACTIVE | Noted: 2024-05-14

## 2024-05-14 PROBLEM — N17.9 AKI (ACUTE KIDNEY INJURY): Status: ACTIVE | Noted: 2024-05-14

## 2024-05-14 PROBLEM — I21.4 NSTEMI (NON-ST ELEVATED MYOCARDIAL INFARCTION): Status: ACTIVE | Noted: 2024-05-14

## 2024-05-14 PROBLEM — I50.32 CHRONIC DIASTOLIC HEART FAILURE: Status: ACTIVE | Noted: 2024-05-14

## 2024-05-14 PROBLEM — K74.60 CIRRHOSIS OF LIVER: Status: ACTIVE | Noted: 2024-05-14

## 2024-05-14 PROBLEM — R79.89 ELEVATED TROPONIN LEVEL: Status: ACTIVE | Noted: 2024-05-14

## 2024-05-14 PROBLEM — I48.92 ATRIAL FLUTTER: Status: ACTIVE | Noted: 2024-05-14

## 2024-05-14 PROBLEM — J44.9 COPD (CHRONIC OBSTRUCTIVE PULMONARY DISEASE): Status: ACTIVE | Noted: 2024-05-14

## 2024-05-14 PROBLEM — I10 ESSENTIAL HYPERTENSION: Status: ACTIVE | Noted: 2024-05-14

## 2024-05-14 LAB
ALBUMIN SERPL BCP-MCNC: 2.9 G/DL (ref 3.5–5)
ALBUMIN/GLOB SERPL: 0.7 {RATIO}
ALP SERPL-CCNC: 81 U/L (ref 45–115)
ALT SERPL W P-5'-P-CCNC: 40 U/L (ref 16–61)
AMPHET UR QL SCN: NEGATIVE
ANION GAP SERPL CALCULATED.3IONS-SCNC: 13 MMOL/L (ref 7–16)
AORTIC ROOT ANNULUS: 2.3 CM
AORTIC VALVE CUSP SEPERATION: 2.17 CM
APTT PPP: 116.5 SECONDS (ref 25.2–37.3)
APTT PPP: 34.3 SECONDS (ref 25.2–37.3)
APTT PPP: 37.6 SECONDS (ref 25.2–37.3)
AST SERPL W P-5'-P-CCNC: 79 U/L (ref 15–37)
AV INDEX (PROSTH): 0.85
AV MEAN GRADIENT: 2 MMHG
AV PEAK GRADIENT: 3 MMHG
AV VALVE AREA BY VELOCITY RATIO: 2.54 CM²
AV VALVE AREA: 2.74 CM²
AV VELOCITY RATIO: 0.79
BACTERIA #/AREA URNS HPF: ABNORMAL /HPF
BARBITURATES UR QL SCN: NEGATIVE
BASOPHILS # BLD AUTO: 0.02 K/UL (ref 0–0.2)
BASOPHILS NFR BLD AUTO: 0.3 % (ref 0–1)
BENZODIAZ METAB UR QL SCN: NEGATIVE
BILIRUB SERPL-MCNC: 1.5 MG/DL (ref ?–1.2)
BILIRUB UR QL STRIP: NEGATIVE
BSA FOR ECHO PROCEDURE: 1.94 M2
BUN SERPL-MCNC: 27 MG/DL (ref 7–18)
BUN/CREAT SERPL: 16 (ref 6–20)
CALCIUM SERPL-MCNC: 8.7 MG/DL (ref 8.5–10.1)
CANNABINOIDS UR QL SCN: NEGATIVE
CHLORIDE SERPL-SCNC: 111 MMOL/L (ref 98–107)
CLARITY UR: ABNORMAL
CO2 SERPL-SCNC: 20 MMOL/L (ref 21–32)
COCAINE UR QL SCN: NEGATIVE
COLOR UR: YELLOW
CREAT SERPL-MCNC: 1.65 MG/DL (ref 0.7–1.3)
CV ECHO LV RWT: 1.06 CM
DIFFERENTIAL METHOD BLD: ABNORMAL
DIGOXIN SERPL-MCNC: 0.4 NG/ML (ref 0.8–2)
DOP CALC AO PEAK VEL: 0.89 M/S
DOP CALC AO VTI: 13.1 CM
DOP CALC LVOT AREA: 3.2 CM2
DOP CALC LVOT DIAMETER: 2.03 CM
DOP CALC LVOT PEAK VEL: 0.7 M/S
DOP CALC LVOT STROKE VOLUME: 35.91 CM3
DOP CALCLVOT PEAK VEL VTI: 11.1 CM
E WAVE DECELERATION TIME: 203.56 MSEC
E/A RATIO: 0.59
E/E' RATIO: 7.45 M/S
ECHO LV POSTERIOR WALL: 1.47 CM (ref 0.6–1.1)
EGFR (NO RACE VARIABLE) (RUSH/TITUS): 46 ML/MIN/1.73M2
EJECTION FRACTION: 40 %
EOSINOPHIL # BLD AUTO: 0.01 K/UL (ref 0–0.5)
EOSINOPHIL NFR BLD AUTO: 0.2 % (ref 1–4)
ERYTHROCYTE [DISTWIDTH] IN BLOOD BY AUTOMATED COUNT: 17.2 % (ref 11.5–14.5)
FRACTIONAL SHORTENING: 32 % (ref 28–44)
GLOBULIN SER-MCNC: 4.1 G/DL (ref 2–4)
GLUCOSE SERPL-MCNC: 114 MG/DL (ref 74–106)
GLUCOSE UR STRIP-MCNC: 50 MG/DL
HCT VFR BLD AUTO: 35.7 % (ref 40–54)
HGB BLD-MCNC: 11.2 G/DL (ref 13.5–18)
HYALINE CASTS #/AREA URNS LPF: ABNORMAL /LPF
IMM GRANULOCYTES # BLD AUTO: 0.02 K/UL (ref 0–0.04)
IMM GRANULOCYTES NFR BLD: 0.3 % (ref 0–0.4)
INR BLD: 1.48
INTERVENTRICULAR SEPTUM: 1.61 CM (ref 0.6–1.1)
IVC DIAMETER: 2.62 CM
KETONES UR STRIP-SCNC: ABNORMAL MG/DL
LEFT ATRIUM VOLUME INDEX MOD: 20.1 ML/M2
LEFT ATRIUM VOLUME MOD: 39.21 CM3
LEFT INTERNAL DIMENSION IN SYSTOLE: 1.87 CM (ref 2.1–4)
LEFT VENTRICLE DIASTOLIC VOLUME INDEX: 14.74 ML/M2
LEFT VENTRICLE DIASTOLIC VOLUME: 28.74 ML
LEFT VENTRICLE MASS INDEX: 77 G/M2
LEFT VENTRICLE SYSTOLIC VOLUME INDEX: 5.5 ML/M2
LEFT VENTRICLE SYSTOLIC VOLUME: 10.79 ML
LEFT VENTRICULAR INTERNAL DIMENSION IN DIASTOLE: 2.77 CM (ref 3.5–6)
LEFT VENTRICULAR MASS: 149.48 G
LEUKOCYTE ESTERASE UR QL STRIP: NEGATIVE
LV LATERAL E/E' RATIO: 5.86 M/S
LV SEPTAL E/E' RATIO: 10.25 M/S
LVOT MG: 1.1 MMHG
LVOT MV: 0.49 CM/S
LYMPHOCYTES # BLD AUTO: 1.13 K/UL (ref 1–4.8)
LYMPHOCYTES NFR BLD AUTO: 17.6 % (ref 27–41)
MCH RBC QN AUTO: 28.6 PG (ref 27–31)
MCHC RBC AUTO-ENTMCNC: 31.4 G/DL (ref 32–36)
MCV RBC AUTO: 91.1 FL (ref 80–96)
MONOCYTES # BLD AUTO: 0.82 K/UL (ref 0–0.8)
MONOCYTES NFR BLD AUTO: 12.8 % (ref 2–6)
MPC BLD CALC-MCNC: 11.1 FL (ref 9.4–12.4)
MUCOUS, UA: ABNORMAL /LPF
MV PEAK A VEL: 0.69 M/S
MV PEAK E VEL: 0.41 M/S
MV STENOSIS PRESSURE HALF TIME: 59.03 MS
MV VALVE AREA P 1/2 METHOD: 3.73 CM2
NEUTROPHILS # BLD AUTO: 4.43 K/UL (ref 1.8–7.7)
NEUTROPHILS NFR BLD AUTO: 68.8 % (ref 53–65)
NITRITE UR QL STRIP: NEGATIVE
NRBC # BLD AUTO: 0 X10E3/UL
NRBC, AUTO (.00): 0 %
OHS CV RV/LV RATIO: 2.09 CM
OPIATES UR QL SCN: NEGATIVE
PCP UR QL SCN: NEGATIVE
PH UR STRIP: 6.5 PH UNITS
PISA TR MAX VEL: 3.65 M/S
PLATELET # BLD AUTO: 101 K/UL (ref 150–400)
POTASSIUM SERPL-SCNC: 4.5 MMOL/L (ref 3.5–5.1)
PROT SERPL-MCNC: 7 G/DL (ref 6.4–8.2)
PROT UR QL STRIP: 600
PROTHROMBIN TIME: 17.7 SECONDS (ref 11.7–14.7)
PV PEAK GRADIENT: 2 MMHG
PV PEAK VELOCITY: 0.64 M/S
RA PRESSURE ESTIMATED: 3 MMHG
RA VOL SYS: 175.5 ML
RBC # BLD AUTO: 3.92 M/UL (ref 4.6–6.2)
RBC # UR STRIP: ABNORMAL /UL
RBC #/AREA URNS HPF: 8 /HPF
RIGHT ATRIAL AREA: 35.7 CM2
RIGHT VENTRICLE DIASTOLIC LENGTH: 7.4 CM
RIGHT VENTRICLE DIASTOLIC MID DIMENSION: 4.6 CM
RIGHT VENTRICULAR END-DIASTOLIC DIMENSION: 5.79 CM
RIGHT VENTRICULAR LENGTH IN DIASTOLE (APICAL 4-CHAMBER VIEW): 7.41 CM
RV MID DIAMA: 4.57 CM
RV TB RVSP: 7 MMHG
SARS-COV-2 RDRP RESP QL NAA+PROBE: NEGATIVE
SODIUM SERPL-SCNC: 139 MMOL/L (ref 136–145)
SP GR UR STRIP: 1.02
SQUAMOUS #/AREA URNS LPF: ABNORMAL /HPF
TDI LATERAL: 0.07 M/S
TDI SEPTAL: 0.04 M/S
TDI: 0.06 M/S
TR MAX PG: 53 MMHG
TRICUSPID ANNULAR PLANE SYSTOLIC EXCURSION: 2.17 CM
TROPONIN I SERPL DL<=0.01 NG/ML-MCNC: 4287 PG/ML
TV REST PULMONARY ARTERY PRESSURE: 56 MMHG
UROBILINOGEN UR STRIP-ACNC: 2 MG/DL
WBC # BLD AUTO: 6.43 K/UL (ref 4.5–11)
WBC #/AREA URNS HPF: 18 /HPF
Z-SCORE OF LEFT VENTRICULAR DIMENSION IN END DIASTOLE: -6.97
Z-SCORE OF LEFT VENTRICULAR DIMENSION IN END SYSTOLE: -4.77

## 2024-05-14 PROCEDURE — 81003 URINALYSIS AUTO W/O SCOPE: CPT | Performed by: EMERGENCY MEDICINE

## 2024-05-14 PROCEDURE — G0378 HOSPITAL OBSERVATION PER HR: HCPCS

## 2024-05-14 PROCEDURE — 94761 N-INVAS EAR/PLS OXIMETRY MLT: CPT

## 2024-05-14 PROCEDURE — 25000003 PHARM REV CODE 250: Performed by: HOSPITALIST

## 2024-05-14 PROCEDURE — 80307 DRUG TEST PRSMV CHEM ANLYZR: CPT | Performed by: EMERGENCY MEDICINE

## 2024-05-14 PROCEDURE — 94640 AIRWAY INHALATION TREATMENT: CPT | Mod: XB

## 2024-05-14 PROCEDURE — 85730 THROMBOPLASTIN TIME PARTIAL: CPT | Performed by: INTERNAL MEDICINE

## 2024-05-14 PROCEDURE — 84484 ASSAY OF TROPONIN QUANT: CPT | Performed by: INTERNAL MEDICINE

## 2024-05-14 PROCEDURE — 63600175 PHARM REV CODE 636 W HCPCS: Performed by: INTERNAL MEDICINE

## 2024-05-14 PROCEDURE — 25000242 PHARM REV CODE 250 ALT 637 W/ HCPCS: Performed by: INTERNAL MEDICINE

## 2024-05-14 PROCEDURE — 25000003 PHARM REV CODE 250: Performed by: EMERGENCY MEDICINE

## 2024-05-14 PROCEDURE — 87086 URINE CULTURE/COLONY COUNT: CPT | Performed by: EMERGENCY MEDICINE

## 2024-05-14 PROCEDURE — 80053 COMPREHEN METABOLIC PANEL: CPT | Performed by: INTERNAL MEDICINE

## 2024-05-14 PROCEDURE — 25500020 PHARM REV CODE 255: Performed by: INTERNAL MEDICINE

## 2024-05-14 PROCEDURE — U0002 COVID-19 LAB TEST NON-CDC: HCPCS | Performed by: INTERNAL MEDICINE

## 2024-05-14 PROCEDURE — 25000003 PHARM REV CODE 250: Performed by: INTERNAL MEDICINE

## 2024-05-14 PROCEDURE — 93005 ELECTROCARDIOGRAM TRACING: CPT

## 2024-05-14 PROCEDURE — 93010 ELECTROCARDIOGRAM REPORT: CPT | Mod: 76,,, | Performed by: HOSPITALIST

## 2024-05-14 PROCEDURE — 99900035 HC TECH TIME PER 15 MIN (STAT)

## 2024-05-14 PROCEDURE — 36415 COLL VENOUS BLD VENIPUNCTURE: CPT | Performed by: INTERNAL MEDICINE

## 2024-05-14 PROCEDURE — 11000001 HC ACUTE MED/SURG PRIVATE ROOM

## 2024-05-14 PROCEDURE — 27000221 HC OXYGEN, UP TO 24 HOURS

## 2024-05-14 PROCEDURE — 99499 UNLISTED E&M SERVICE: CPT | Mod: ,,, | Performed by: INTERNAL MEDICINE

## 2024-05-14 PROCEDURE — 85025 COMPLETE CBC W/AUTO DIFF WBC: CPT | Performed by: INTERNAL MEDICINE

## 2024-05-14 PROCEDURE — 85610 PROTHROMBIN TIME: CPT | Performed by: INTERNAL MEDICINE

## 2024-05-14 PROCEDURE — 80162 ASSAY OF DIGOXIN TOTAL: CPT | Performed by: INTERNAL MEDICINE

## 2024-05-14 RX ORDER — ONDANSETRON HYDROCHLORIDE 2 MG/ML
4 INJECTION, SOLUTION INTRAVENOUS EVERY 8 HOURS PRN
Status: DISCONTINUED | OUTPATIENT
Start: 2024-05-14 | End: 2024-05-17 | Stop reason: HOSPADM

## 2024-05-14 RX ORDER — METOPROLOL TARTRATE 25 MG/1
12.5 TABLET ORAL 2 TIMES DAILY
Status: DISCONTINUED | OUTPATIENT
Start: 2024-05-14 | End: 2024-05-14

## 2024-05-14 RX ORDER — HYDROXYZINE HYDROCHLORIDE 25 MG/1
25 TABLET, FILM COATED ORAL 3 TIMES DAILY PRN
Status: DISCONTINUED | OUTPATIENT
Start: 2024-05-14 | End: 2024-05-17 | Stop reason: HOSPADM

## 2024-05-14 RX ORDER — LORAZEPAM 2 MG/ML
INJECTION INTRAMUSCULAR
Status: DISCONTINUED | OUTPATIENT
Start: 2024-05-14 | End: 2024-05-14 | Stop reason: HOSPADM

## 2024-05-14 RX ORDER — ATORVASTATIN CALCIUM 80 MG/1
80 TABLET, FILM COATED ORAL NIGHTLY
Status: DISCONTINUED | OUTPATIENT
Start: 2024-05-14 | End: 2024-05-17 | Stop reason: HOSPADM

## 2024-05-14 RX ORDER — DIGOXIN 125 MCG
125 TABLET ORAL DAILY
Status: DISCONTINUED | OUTPATIENT
Start: 2024-05-14 | End: 2024-05-17 | Stop reason: HOSPADM

## 2024-05-14 RX ORDER — IPRATROPIUM BROMIDE 0.5 MG/2.5ML
0.5 SOLUTION RESPIRATORY (INHALATION) EVERY 8 HOURS
Status: DISCONTINUED | OUTPATIENT
Start: 2024-05-14 | End: 2024-05-17 | Stop reason: HOSPADM

## 2024-05-14 RX ORDER — NALOXONE HCL 0.4 MG/ML
0.02 VIAL (ML) INJECTION
Status: DISCONTINUED | OUTPATIENT
Start: 2024-05-14 | End: 2024-05-17 | Stop reason: HOSPADM

## 2024-05-14 RX ORDER — LEVALBUTEROL INHALATION SOLUTION 1.25 MG/3ML
1.25 SOLUTION RESPIRATORY (INHALATION) EVERY 8 HOURS
Status: DISCONTINUED | OUTPATIENT
Start: 2024-05-14 | End: 2024-05-14

## 2024-05-14 RX ORDER — HEPARIN SODIUM,PORCINE/D5W 25000/250
0-40 INTRAVENOUS SOLUTION INTRAVENOUS CONTINUOUS
Status: DISCONTINUED | OUTPATIENT
Start: 2024-05-14 | End: 2024-05-15

## 2024-05-14 RX ORDER — SODIUM CHLORIDE 0.9 % (FLUSH) 0.9 %
10 SYRINGE (ML) INJECTION EVERY 12 HOURS PRN
Status: DISCONTINUED | OUTPATIENT
Start: 2024-05-14 | End: 2024-05-17 | Stop reason: HOSPADM

## 2024-05-14 RX ORDER — SERTRALINE HYDROCHLORIDE 100 MG/1
50 TABLET, FILM COATED ORAL DAILY
COMMUNITY
Start: 2023-07-11

## 2024-05-14 RX ORDER — ETODOLAC 400 MG/1
400 TABLET, FILM COATED ORAL 2 TIMES DAILY PRN
Status: ON HOLD | COMMUNITY
Start: 2023-09-18 | End: 2024-05-17 | Stop reason: HOSPADM

## 2024-05-14 RX ORDER — FUROSEMIDE 40 MG/1
40 TABLET ORAL DAILY
Status: DISCONTINUED | OUTPATIENT
Start: 2024-05-14 | End: 2024-05-14

## 2024-05-14 RX ORDER — ASPIRIN 325 MG
325 TABLET ORAL
Status: COMPLETED | OUTPATIENT
Start: 2024-05-14 | End: 2024-05-14

## 2024-05-14 RX ORDER — ASPIRIN 81 MG/1
81 TABLET ORAL DAILY
Status: DISCONTINUED | OUTPATIENT
Start: 2024-05-14 | End: 2024-05-17 | Stop reason: HOSPADM

## 2024-05-14 RX ORDER — LEVALBUTEROL INHALATION SOLUTION 1.25 MG/3ML
1.25 SOLUTION RESPIRATORY (INHALATION) EVERY 8 HOURS
Status: DISCONTINUED | OUTPATIENT
Start: 2024-05-14 | End: 2024-05-17 | Stop reason: HOSPADM

## 2024-05-14 RX ORDER — PROCHLORPERAZINE EDISYLATE 5 MG/ML
5 INJECTION INTRAMUSCULAR; INTRAVENOUS EVERY 6 HOURS PRN
Status: DISCONTINUED | OUTPATIENT
Start: 2024-05-14 | End: 2024-05-17 | Stop reason: HOSPADM

## 2024-05-14 RX ORDER — HYDROXYZINE HYDROCHLORIDE 25 MG/1
25 TABLET, FILM COATED ORAL NIGHTLY PRN
COMMUNITY
Start: 2023-09-18

## 2024-05-14 RX ORDER — FUROSEMIDE 40 MG/1
1 TABLET ORAL DAILY
Status: ON HOLD | COMMUNITY
End: 2024-05-17 | Stop reason: HOSPADM

## 2024-05-14 RX ORDER — IBUPROFEN 200 MG
1 TABLET ORAL DAILY
COMMUNITY
Start: 2023-11-21

## 2024-05-14 RX ORDER — POTASSIUM CHLORIDE 750 MG/1
10 TABLET, EXTENDED RELEASE ORAL DAILY
Status: ON HOLD | COMMUNITY
Start: 2023-11-21 | End: 2024-05-17 | Stop reason: HOSPADM

## 2024-05-14 RX ORDER — LORAZEPAM 2 MG/ML
2 INJECTION INTRAMUSCULAR EVERY 10 MIN PRN
Status: DISCONTINUED | OUTPATIENT
Start: 2024-05-14 | End: 2024-05-15

## 2024-05-14 RX ORDER — DILTIAZEM HYDROCHLORIDE 180 MG/1
180 CAPSULE, COATED, EXTENDED RELEASE ORAL DAILY
Status: DISCONTINUED | OUTPATIENT
Start: 2024-05-14 | End: 2024-05-14

## 2024-05-14 RX ADMIN — DIGOXIN 125 MCG: 125 TABLET ORAL at 09:05

## 2024-05-14 RX ADMIN — LEVALBUTEROL HYDROCHLORIDE 1.25 MG: 1.25 SOLUTION RESPIRATORY (INHALATION) at 07:05

## 2024-05-14 RX ADMIN — IOPAMIDOL 100 ML: 755 INJECTION, SOLUTION INTRAVENOUS at 12:05

## 2024-05-14 RX ADMIN — ASPIRIN 325 MG ORAL TABLET 325 MG: 325 PILL ORAL at 12:05

## 2024-05-14 RX ADMIN — ATORVASTATIN CALCIUM 80 MG: 80 TABLET, FILM COATED ORAL at 09:05

## 2024-05-14 RX ADMIN — TICAGRELOR 180 MG: 90 TABLET ORAL at 10:05

## 2024-05-14 RX ADMIN — SODIUM CHLORIDE 500 ML: 9 INJECTION, SOLUTION INTRAVENOUS at 06:05

## 2024-05-14 RX ADMIN — IPRATROPIUM BROMIDE 0.5 MG: 0.5 SOLUTION RESPIRATORY (INHALATION) at 02:05

## 2024-05-14 RX ADMIN — HYDROXYZINE HYDROCHLORIDE 25 MG: 25 TABLET ORAL at 09:05

## 2024-05-14 RX ADMIN — LEVALBUTEROL HYDROCHLORIDE 1.25 MG: 1.25 SOLUTION RESPIRATORY (INHALATION) at 11:05

## 2024-05-14 RX ADMIN — IPRATROPIUM BROMIDE 0.5 MG: 0.5 SOLUTION RESPIRATORY (INHALATION) at 07:05

## 2024-05-14 RX ADMIN — ASPIRIN 81 MG: 81 TABLET, COATED ORAL at 09:05

## 2024-05-14 RX ADMIN — HYDROXYZINE HYDROCHLORIDE 25 MG: 25 TABLET ORAL at 01:05

## 2024-05-14 RX ADMIN — IPRATROPIUM BROMIDE 0.5 MG: 0.5 SOLUTION RESPIRATORY (INHALATION) at 11:05

## 2024-05-14 RX ADMIN — ATORVASTATIN CALCIUM 80 MG: 80 TABLET, FILM COATED ORAL at 02:05

## 2024-05-14 RX ADMIN — LORAZEPAM 2 MG: 2 INJECTION INTRAMUSCULAR; INTRAVENOUS at 11:05

## 2024-05-14 RX ADMIN — METOPROLOL TARTRATE 12.5 MG: 25 TABLET, FILM COATED ORAL at 02:05

## 2024-05-14 RX ADMIN — LEVALBUTEROL HYDROCHLORIDE 1.25 MG: 1.25 SOLUTION RESPIRATORY (INHALATION) at 02:05

## 2024-05-14 RX ADMIN — HEPARIN SODIUM 15.94 UNITS/KG/HR: 10000 INJECTION, SOLUTION INTRAVENOUS at 02:05

## 2024-05-14 NOTE — H&P
[facility] - Emergency Department  [facility] Medicine  History & Physical    Patient Name: Steven Crouch  MRN: [id number]  Patient Class: Emergency  Admission Date: 5/13/2024  Attending Physician:  CHRISTIANE Guthrie MD  Primary Care Provider: Kymberly Primary Doctor         Patient information was obtained from patient and ER records.     Subjective:     Principal Problem:Syncopal episodes    Chief Complaint:   Chief Complaint   Patient presents with    Altered Mental Status        HPI: Patient is a 64-year-old male with a history of a flutter on rate control but not on any anticoagulation in the setting of chronic grade 1 diastolic dysfunction, essential hypertension, COPD, cirrhosis of liver associated with alcoholism and hepatitis-C who was brought in by EMS after having been found poorly responsive sitting on a porch by his friend.  Last time known normal was approximately 30 minutes.  Patient did not appeared to have had any fecal or urinary incontinence.  Patient also did not appear to have any focal neurological deficits.  When EMS arrived patient exhibit some degree of dysarthria but this shortly was resolved.  Patient however was found to be hypotensive with systolic blood pressure in the 80s and blood pressure normalized after having had received IV fluids EN route to the hospital.    On presentation, vital signs were stable and patient was afebrile.  Workup was notable for presence of elevated proBNP of 7563, and elevated troponin level 403 ---> 1,846 without any ischemic abnormalities seen on EKG.  The rest of the workup in ED was otherwise unremarkable.  Patient will be admitted for further evaluation and intervention    Past Medical History:   Diagnosis Date    A-fib     Hypertension     Unspecified cirrhosis of liver        History reviewed. No pertinent surgical history.    Review of patient's allergies indicates:   Allergen Reactions    Mobic [meloxicam] Other (See Comments)     Stomach pain         No current facility-administered medications on file prior to encounter.     Current Outpatient Medications on File Prior to Encounter   Medication Sig    digoxin (LANOXIN) 125 mcg tablet Take 125 mcg by mouth once daily.    diltiaZEM (CARDIZEM CD) 180 MG 24 hr capsule Take 180 mg by mouth once daily.     Family History    None       Tobacco Use    Smoking status: Never    Smokeless tobacco: Never   Substance and Sexual Activity    Alcohol use: Yes     Alcohol/week: 6.0 standard drinks of alcohol     Types: 6 Cans of beer per week    Drug use: Never    Sexual activity: Not on file     Review of Systems   Constitutional:  Negative for chills, diaphoresis, fatigue and fever.   HENT:  Negative for congestion, hearing loss, nosebleeds, postnasal drip, sore throat, tinnitus and trouble swallowing.    Eyes:  Negative for photophobia, pain, discharge, itching and visual disturbance.   Respiratory:  Negative for cough, shortness of breath, wheezing and stridor.    Cardiovascular:  Negative for chest pain, palpitations and leg swelling.   Gastrointestinal:  Negative for abdominal distention, abdominal pain, anal bleeding, blood in stool, constipation, diarrhea, nausea and vomiting.   Endocrine: Negative for cold intolerance, heat intolerance, polydipsia, polyphagia and polyuria.   Genitourinary:  Negative for decreased urine volume, difficulty urinating, dysuria, flank pain, frequency, hematuria and urgency.   Musculoskeletal:  Negative for arthralgias, back pain, gait problem, joint swelling, myalgias, neck pain and neck stiffness.   Skin:  Negative for color change, pallor and rash.   Allergic/Immunologic: Negative for immunocompromised state.   Neurological:  Positive for syncope. Negative for dizziness, tremors, seizures, facial asymmetry, speech difficulty, weakness, light-headedness, numbness and headaches.   Hematological:  Negative for adenopathy. Does not bruise/bleed easily.     Objective:     Vital Signs  (Most Recent):  Temp: 98.2 °F (36.8 °C) (05/14/24 0045)  Pulse: 94 (05/14/24 0036)  Resp: 17 (05/14/24 0036)  BP: 114/83 (05/14/24 0036)  SpO2: (!) 91 % (05/14/24 0036) Vital Signs (24h Range):  Temp:  [97.4 °F (36.3 °C)-98.2 °F (36.8 °C)] 98.2 °F (36.8 °C)  Pulse:  [] 94  Resp:  [12-20] 17  SpO2:  [81 %-96 %] 91 %  BP: ()/(58-84) 114/83     Weight: 75.3 kg (166 lb)  Body mass index is 23.15 kg/m².     Physical Exam  Vitals reviewed.   Constitutional:       General: He is not in acute distress.     Appearance: Normal appearance.   HENT:      Head: Normocephalic and atraumatic.      Right Ear: External ear normal.      Left Ear: External ear normal.   Eyes:      Extraocular Movements: Extraocular movements intact.      Pupils: Pupils are equal, round, and reactive to light.   Cardiovascular:      Rate and Rhythm: Normal rate and regular rhythm.      Pulses: Normal pulses.      Heart sounds: Normal heart sounds. No murmur heard.  Pulmonary:      Effort: Pulmonary effort is normal. No respiratory distress.      Breath sounds: Normal breath sounds. No wheezing.   Chest:      Chest wall: No tenderness.   Abdominal:      General: Abdomen is flat.      Palpations: Abdomen is soft. There is no mass.      Tenderness: There is no abdominal tenderness. There is no right CVA tenderness or left CVA tenderness.   Musculoskeletal:         General: No swelling or tenderness. Normal range of motion.   Skin:     General: Skin is warm and dry.      Capillary Refill: Capillary refill takes less than 2 seconds.   Neurological:      General: No focal deficit present.      Mental Status: He is alert and oriented to person, place, and time. Mental status is at baseline.   Psychiatric:         Mood and Affect: Mood normal.         Thought Content: Thought content normal.              CRANIAL NERVES     CN III, IV, VI   Pupils are equal, round, and reactive to light.       Significant Labs: All pertinent labs within the past 24  hours have been reviewed.    Significant Imaging: I have reviewed all pertinent imaging results/findings within the past 24 hours.  Assessment/Plan:     * Syncopal episodes    Cause of syncopal episode is unknown at this time.  Initial workup has not elucidate the culprit.  Will proceed with echocardiogram and carotid duplex.  Patient will be monitored in telemetry unit as well      Elevated troponin level    Patient was found to have elevated troponin 403--->1,846 without any ischemic abnormalities seen on EKG or any chest pain or anginal equivalents.  Due to the significant increase in delta troponin.  Patient will be started on ACS protocol.  Cardiology will be consulted      Chronic diastolic heart failure    Patient has grade 1 diastolic heart failure and is reportedly on water pill on a daily basis but patient's medication list does not reflect the patient is taking Lasix.  Will start patient on 40 mg of Lasix daily      Cirrhosis of liver    It appeared that patient was once diagnosed with cirrhosis of liver associated with alcoholism and hepatitis-C.  However patient no longer drinks and completed hepatitis-C treatment regimen.  Total bilirubin level and transaminases levels were almost within normal range.  Will check PT INR and PTT    COPD (chronic obstructive pulmonary disease)    Patient is intolerant of albuterol which causes heart palpitation.  Will start patient on Xopenex and Atrovent neb treatments    Essential hypertension    Chronic, controlled. Latest blood pressure and vitals reviewed-     Temp:  [97.4 °F (36.3 °C)-98.2 °F (36.8 °C)]   Pulse:  []   Resp:  [12-20]   BP: ()/(58-84)   SpO2:  [81 %-96 %] .   Home meds for hypertension were reviewed and noted below.   Hypertension Medications               diltiaZEM (CARDIZEM CD) 180 MG 24 hr capsule Take 180 mg by mouth once daily.            While in the hospital, will manage blood pressure as follows; Continue home antihypertensive  regimen    Will utilize p.r.n. blood pressure medication only if patient's blood pressure greater than 180/110 and he develops symptoms such as worsening chest pain or shortness of breath.      Atrial flutter    Rate is adequately controlled on diltiazem and digoxin, but patient is not on any anticoagulation with CHADS-VASc score of 3 and has bled score of 3.  Reportedly, patient [facility] a [facility] at [facility] a regular basis        VTE Risk Mitigation (From admission, onward)      Patient will be started on heparin infusion per ACS protocol                            Ilia Galvan MD  Department of Hospital Medicine  [facility] - Emergency Department

## 2024-05-14 NOTE — ASSESSMENT & PLAN NOTE
Patient is intolerant of albuterol which causes heart palpitation.  Will start patient on Xopenex and Atrovent neb treatments

## 2024-05-14 NOTE — ED TRIAGE NOTES
EMS from home with c/o AMS. EMS reports neighbor called and found patient on the porch and unsure how long he had been there. Patient found cold, clammy, and some slurred speech.

## 2024-05-14 NOTE — PROGRESS NOTES
[facility] - Emergency Department  [facility] Medicine  Progress Note    Patient Name: Steven Crouch  MRN: [id number]  Patient Class: IP- Inpatient   Admission Date: 5/13/2024  Length of Stay: 0 days  Attending Physician: Ramesh Guthrie MD  Primary Care Provider: Kymberly, Primary Doctor        Subjective:     Principal Problem:Syncopal episodes        HPI:  Patient is a 64-year-old male with a history of a flutter on rate control but not on any anticoagulation in the setting of chronic grade 1 diastolic dysfunction, essential hypertension, COPD, cirrhosis of liver associated with alcoholism and hepatitis-C who was brought in by EMS after having been found poorly responsive sitting on a porch by his friend.  Last time known normal was approximately 30 minutes.  Patient did not appeared to have had any fecal or urinary incontinence.  Patient also did not appear to have any focal neurological deficits.  When EMS arrived patient exhibit some degree of dysarthria but this shortly was resolved.  Patient however was found to be hypotensive with systolic blood pressure in the 80s and blood pressure normalized after having had received IV fluids EN route to the hospital.    On presentation, vital signs were stable and patient was afebrile.  Workup was notable for presence of elevated proBNP of 7563, and elevated troponin level 403 ---> 1,846 without any ischemic abnormalities seen on EKG.  The rest of the workup in ED was otherwise unremarkable.  Patient will be admitted for further evaluation and intervention    Overview/Hospital Course:  5/14- Seen patient with Dr. Denny from cardiology, concern for significant RV overload and PE- ordered CTA chest, if negative then patient will proceed with left heart catheterization. Continue heparin infusion.     Interval History: Patient seen and examined at the bedside, Dr. Denny present as well. Patient reports shortness of breath.     Review of Systems   Respiratory:   Positive for shortness of breath.      Objective:     Vital Signs (Most Recent):  Temp: 98.4 °F (36.9 °C) (05/14/24 0501)  Pulse: 79 (05/14/24 1019)  Resp: 15 (05/14/24 1019)  BP: 98/67 (05/14/24 1019)  SpO2: 97 % (05/14/24 1019) Vital Signs (24h Range):  Temp:  [97.4 °F (36.3 °C)-98.4 °F (36.9 °C)] 98.4 °F (36.9 °C)  Pulse:  [] 79  Resp:  [10-21] 15  SpO2:  [81 %-98 %] 97 %  BP: ()/(51-84) 98/67     Weight: 75.3 kg (166 lb)  Body mass index is 23.15 kg/m².    Intake/Output Summary (Last 24 hours) at 5/14/2024 1113  Last data filed at 5/14/2024 0728  Gross per 24 hour   Intake 1500 ml   Output --   Net 1500 ml         Physical Exam  Vitals and nursing note reviewed.   Constitutional:       Appearance: He is ill-appearing.   HENT:      Head: Normocephalic and atraumatic.      Nose: Nose normal.      Mouth/Throat:      Mouth: Mucous membranes are moist.   Eyes:      Extraocular Movements: Extraocular movements intact.   Cardiovascular:      Rate and Rhythm: Normal rate and regular rhythm.      Pulses: Normal pulses.      Heart sounds: Normal heart sounds.   Pulmonary:      Effort: Pulmonary effort is normal.      Breath sounds: Wheezing present.      Comments: On 3L NC  Abdominal:      General: Bowel sounds are normal.      Palpations: Abdomen is soft.   Musculoskeletal:         General: Normal range of motion.      Cervical back: Normal range of motion.   Skin:     General: Skin is warm.   Neurological:      General: No focal deficit present.      Mental Status: He is alert and oriented to person, place, and time. Mental status is at baseline.   Psychiatric:         Mood and Affect: Mood normal.         Behavior: Behavior normal.             Significant Labs: All pertinent labs within the past 24 hours have been reviewed.    Significant Imaging: I have reviewed all pertinent imaging results/findings within the past 24 hours.    Assessment/Plan:      * Syncopal episodes  Suspect sec to acute PE vs  NSTEMI.  Management/workup as below.       NSTEMI (non-ST elevated myocardial infarction)  Patient was found to have elevated troponin 403--->1,846 without any ischemic abnormalities seen on EKG.  Patient reports exertional dyspnea which will be angina equivalent.   Echocardiogram pending, discussed with cardiologist Dr. Denny who looked at the echo, concern for RV overload possibly sec to PE.  Ordered CTA chest to rule out PE, if ruled out, patient will undergo LHC  Started on heparin infusion, aspirin and statin.   Monitor on tele.       Acute respiratory failure with hypoxia  Concern for pulmonary HTN and RV overload per echo.  ABG showed pO2 59, normal pH.   Requiring 3L NC since admission.  Management as above.     LEIDY (acute kidney injury)  Patient with acute kidney injury/acute renal failure likely due to pre-renal azotemia due to IVVD LEIDY is currently stable. Baseline creatinine  0.9  - Labs reviewed- Renal function/electrolytes with Estimated Creatinine Clearance: 47.5 mL/min (A) (based on SCr of 1.65 mg/dL (H)). according to latest data. Monitor urine output and serial BMP and adjust therapy as needed. Avoid nephrotoxins and renally dose meds for GFR listed above.    Chronic diastolic heart failure  Clinically appears euvolemic.   Patient has grade 1 diastolic heart failure and is reportedly on water pill on a daily basis but patient's medication list does not reflect the patient is taking Lasix.  Hold on starting PO Lasix for now given soft pressures.       Cirrhosis of liver  It appeared that patient was once diagnosed with cirrhosis of liver associated with alcoholism and hepatitis-C.    However patient no longer drinks and completed hepatitis-C treatment regimen.  T  Total bilirubin level and transaminases levels were almost within normal range, PT/INR 1.5.    COPD (chronic obstructive pulmonary disease)  Continues to smoke.   Patient is intolerant of albuterol which causes heart palpitation.     Started patient on Xopenex and Atrovent neb treatments    Essential hypertension    Chronic, controlled. Latest blood pressure and vitals reviewed-     Temp:  [97.4 °F (36.3 °C)-98.4 °F (36.9 °C)]   Pulse:  []   Resp:  [10-21]   BP: ()/(51-84)   SpO2:  [81 %-98 %] .   Home meds for hypertension were reviewed and noted below.   Hypertension Medications               diltiaZEM (CARDIZEM CD) 180 MG 24 hr capsule Take 180 mg by mouth once daily.          Soft pressures noted, hold BP meds for now.     Will utilize p.r.n. blood pressure medication only if patient's blood pressure greater than 180/110 and he develops symptoms such as worsening chest pain or shortness of breath.      Atrial flutter  Rate is adequately controlled on diltiazem and digoxin, but patient is not on any anticoagulation with CHADS-VASc score of 3 and has bled score of 3.  Reportedly, patient sees a [facility] a regular basis  Resume digoxin, continue IV heparin, hold diltiazem given soft pressures.   Monitor on tele.        VTE Risk Mitigation (From admission, onward)           Ordered     Reason for No Pharmacological VTE Prophylaxis  Once        Comments: Patient is on ACS protocol with heparin infusion   Question:  Reasons:  Answer:  Physician Provided (leave comment)    05/14/24 0205     IP VTE HIGH RISK PATIENT  Once         05/14/24 0205     Place sequential compression device  Until discontinued         05/14/24 0205     heparin 25,000 units in dextrose 5% 250 mL (100 units/mL) infusion LOW INTENSITY nomogram - [facility]  Continuous        Question:  Begin at (units/kg/hr)  Answer:  12    05/14/24 0159     heparin 25,000 units in dextrose 5% (100 units/ml) IV bolus from bag LOW INTENSITY nomogram - [facility]  As needed (PRN)        Question:  Heparin Infusion Adjustment (DO NOT MODIFY ANSWER)  Answer:  [email or web address]\epic\Images\Pharmacy\HeparinInfusions\heparin LOW INTENSITY nomogram for [facility] [id number]    05/14/24 0159      heparin 25,000 units in dextrose 5% (100 units/ml) IV bolus from bag LOW INTENSITY nomogram - [facility]  As needed (PRN)        Question:  Heparin Infusion Adjustment (DO NOT MODIFY ANSWER)  Answer:  [email or web address]\epic\Images\Pharmacy\HeparinInfusions\heparin LOW INTENSITY nomogram for [facility] [id number]    05/14/24 0159                    Discharge Planning   REENA: 5/16/2024     Code Status: Full Code   Is the patient medically ready for discharge?:     Reason for patient still in hospital (select all that apply): Patient trending condition, Imaging, and Consult recommendations                     PENG STARK MD  Department of Hospital Medicine   [facility] - Emergency Department

## 2024-05-14 NOTE — ASSESSMENT & PLAN NOTE
Chronic, controlled. Latest blood pressure and vitals reviewed-     Temp:  [97.4 °F (36.3 °C)-98.4 °F (36.9 °C)]   Pulse:  []   Resp:  [10-21]   BP: ()/(51-84)   SpO2:  [81 %-98 %] .   Home meds for hypertension were reviewed and noted below.   Hypertension Medications               diltiaZEM (CARDIZEM CD) 180 MG 24 hr capsule Take 180 mg by mouth once daily.          Soft pressures noted, hold BP meds for now.     Will utilize p.r.n. blood pressure medication only if patient's blood pressure greater than 180/110 and he develops symptoms such as worsening chest pain or shortness of breath.

## 2024-05-14 NOTE — ASSESSMENT & PLAN NOTE
Chronic, controlled. Latest blood pressure and vitals reviewed-     Temp:  [97.4 °F (36.3 °C)-98.2 °F (36.8 °C)]   Pulse:  []   Resp:  [12-20]   BP: ()/(58-84)   SpO2:  [81 %-96 %] .   Home meds for hypertension were reviewed and noted below.   Hypertension Medications               diltiaZEM (CARDIZEM CD) 180 MG 24 hr capsule Take 180 mg by mouth once daily.            While in the hospital, will manage blood pressure as follows; Continue home antihypertensive regimen    Will utilize p.r.n. blood pressure medication only if patient's blood pressure greater than 180/110 and he develops symptoms such as worsening chest pain or shortness of breath.

## 2024-05-14 NOTE — PLAN OF CARE
[facility] - Emergency Department  Initial Discharge Assessment       Primary Care Provider: No, Primary Doctor    Admission Diagnosis: Syncopal episodes [R55]  Altered mental status [R41.82]    Admission Date: 5/13/2024  Expected Discharge Date: 5/16/2024    Transition of Care Barriers: None    Payor: MEDICARE / Plan: MEDICARE PART A & B / Product Type: Government /     Extended Emergency Contact Information  Primary Emergency Contact: ManniesilverleydiLee  Address: [address] [place] [address]  Mobile Phone: [phone]  Relation: Friend  Preferred language: English   needed? No    Discharge Plan A: Home with family  Discharge Plan B: Home with family, Home Health      [facility] [address]  [address] [facility] [address]  Phone: [phone] Fax: [phone]    [facility] [address]  [address]  [place] [address]  Phone: [phone] Fax: [phone]      Initial Assessment (most recent)       Adult Discharge Assessment - 05/14/24 1517          Discharge Assessment    Assessment Type Discharge Planning Assessment     Confirmed/corrected address, phone number and insurance Yes     Confirmed Demographics Correct on Facesheet     Source of Information other (see comments)   emergency contact, Lee Jensen, friend; lindsey Cuellar    If unable to respond/provide information was family/caregiver contacted? Yes     Contact Name/Number Lee Jensen [phone], Tyler Quinn [phone]     People in Home friend(s)     Do you expect to return to your current living situation? Yes   lindsey cuellar    Do you have help at home or someone to help you manage your care at home? Yes     Who are your caregiver(s) and their phone number(s)? Tyler Quinn, [phone]     Prior to hospitilization cognitive status: Unable to Assess     Current cognitive status: Unable  to Assess     Walking or Climbing Stairs Difficulty yes     Walking or Climbing Stairs ambulation difficulty, requires equipment     Mobility Management cane     Dressing/Bathing Difficulty no     Home Accessibility wheelchair accessible     Home Layout Able to live on 1st floor     Equipment Currently Used at Home cane, straight     Readmission within 30 days? No     Patient currently being followed by outpatient case management? No     Do you currently have service(s) that help you manage your care at home? No     Do you take prescription medications? Yes     Do you have prescription coverage? Yes     Coverage Medicare     Do you have any problems affording any of your prescribed medications? No     Is the patient taking medications as prescribed? yes     Who is going to help you get home at discharge? Tyler Quinn [phone]     How do you get to doctors appointments? car, drives self;family or friend will provide     Are you on dialysis? No     Do you take coumadin? No     Discharge Plan A Home with family     Discharge Plan B Home with family;Home Health     DME Needed Upon Discharge  none     Discharge Plan discussed with: Friend     Name(s) and Number(s) Tyler Quinn [phone]     Transition of Care Barriers None        Physical Activity    On average, how many days per week do you engage in moderate to strenuous exercise (like a brisk walk)? 0 days     On average, how many minutes do you engage in exercise at this level? 0 min        Financial Resource Strain    How hard is it for you to pay for the very basics like food, housing, medical care, and heating? Not hard at all        Housing Stability    In the last 12 months, was there a time when you were not able to pay the mortgage or rent on time? No     At any time in the past 12 months, were you homeless or living in a shelter (including now)? Patient unable to answer        Transportation Needs    Has the lack of transportation kept you from  medical appointments, meetings, work or from getting things needed for daily living? Patient unable to answer        Food Insecurity    Within the past 12 months, you worried that your food would run out before you got the money to buy more. Patient unable to answer     Within the past 12 months, the food you bought just didn't last and you didn't have money to get more. Patient unable to answer        Stress    Do you feel stress - tense, restless, nervous, or anxious, or unable to sleep at night because your mind is troubled all the time - these days? Patient unable to answer        Social Isolation    How often do you feel lonely or isolated from those around you?  Patient unable to answer        Alcohol Use    Q1: How often do you have a drink containing alcohol? 4 or more times a week     Q2: How many drinks containing alcohol do you have on a typical day when you are drinking? 5 or 6   6-pack beer    Q3: How often do you have six or more drinks on one occasion? Daily or almost daily        Utilities    In the past 12 months has the electric, gas, oil, or water company threatened to shut off services in your home? No        Health Literacy    How often do you need to have someone help you when you read instructions, pamphlets, or other written material from your doctor or pharmacy? Rarely                      SS spoke with pt's friend Tyler Quinn. Per Tyler([phone]), pt currently staying with Tyler's sister, Amanda and her  Lee. Per Tyler, the pt is able to dc home with Tyler when medically ready for discharge. Pt currently has needed DME (cane). SDOH completed. IM obtained verbally from Tyler. SS following for anticipated dc needs.    Per Tyler, pt's son is Blayne [phone].

## 2024-05-14 NOTE — ASSESSMENT & PLAN NOTE
It appeared that patient was once diagnosed with cirrhosis of liver associated with alcoholism and hepatitis-C.  However patient no longer drinks and completed hepatitis-C treatment regimen.  Total bilirubin level and transaminases levels were almost within normal range.  Will check PT INR and PTT

## 2024-05-14 NOTE — ASSESSMENT & PLAN NOTE
Patient was found to have elevated troponin 403--->1,846 without any ischemic abnormalities seen on EKG.  Patient reports exertional dyspnea which will be angina equivalent.   Echocardiogram pending, discussed with cardiologist Dr. Denny who looked at the echo, concern for RV overload possibly sec to PE.  Ordered CTA chest to rule out PE, if ruled out, patient will undergo LHC  Started on heparin infusion, aspirin and statin.   Monitor on tele.

## 2024-05-14 NOTE — ASSESSMENT & PLAN NOTE
Rate is adequately controlled on diltiazem and digoxin, but patient is not on any anticoagulation with CHADS-VASc score of 3 and has bled score of 3.  Reportedly, [facility] basis

## 2024-05-14 NOTE — ASSESSMENT & PLAN NOTE
Rate is adequately controlled on diltiazem and digoxin, but patient is not on any anticoagulation with CHADS-VASc score of 3 and has bled score of 3.  Reportedly, patient sees a cardiologist at [facility] on a regular basis  Resume digoxin, continue IV heparin, hold diltiazem given soft pressures.   Monitor on tele.

## 2024-05-14 NOTE — SUBJECTIVE & OBJECTIVE
Past Medical History:   Diagnosis Date    A-fib     Hypertension     Unspecified cirrhosis of liver        History reviewed. No pertinent surgical history.    Review of patient's allergies indicates:   Allergen Reactions    Mobic [meloxicam] Other (See Comments)     Stomach pain        No current facility-administered medications on file prior to encounter.     Current Outpatient Medications on File Prior to Encounter   Medication Sig    digoxin (LANOXIN) 125 mcg tablet Take 125 mcg by mouth once daily.    diltiaZEM (CARDIZEM CD) 180 MG 24 hr capsule Take 180 mg by mouth once daily.     Family History    None       Tobacco Use    Smoking status: Never    Smokeless tobacco: Never   Substance and Sexual Activity    Alcohol use: Yes     Alcohol/week: 6.0 standard drinks of alcohol     Types: 6 Cans of beer per week    Drug use: Never    Sexual activity: Not on file     Review of Systems   Constitutional:  Negative for chills, diaphoresis, fatigue and fever.   HENT:  Negative for congestion, hearing loss, nosebleeds, postnasal drip, sore throat, tinnitus and trouble swallowing.    Eyes:  Negative for photophobia, pain, discharge, itching and visual disturbance.   Respiratory:  Negative for cough, shortness of breath, wheezing and stridor.    Cardiovascular:  Negative for chest pain, palpitations and leg swelling.   Gastrointestinal:  Negative for abdominal distention, abdominal pain, anal bleeding, blood in stool, constipation, diarrhea, nausea and vomiting.   Endocrine: Negative for cold intolerance, heat intolerance, polydipsia, polyphagia and polyuria.   Genitourinary:  Negative for decreased urine volume, difficulty urinating, dysuria, flank pain, frequency, hematuria and urgency.   Musculoskeletal:  Negative for arthralgias, back pain, gait problem, joint swelling, myalgias, neck pain and neck stiffness.   Skin:  Negative for color change, pallor and rash.   Allergic/Immunologic: Negative for immunocompromised  state.   Neurological:  Positive for syncope. Negative for dizziness, tremors, seizures, facial asymmetry, speech difficulty, weakness, light-headedness, numbness and headaches.   Hematological:  Negative for adenopathy. Does not bruise/bleed easily.     Objective:     Vital Signs (Most Recent):  Temp: 98.2 °F (36.8 °C) (05/14/24 0045)  Pulse: 94 (05/14/24 0036)  Resp: 17 (05/14/24 0036)  BP: 114/83 (05/14/24 0036)  SpO2: (!) 91 % (05/14/24 0036) Vital Signs (24h Range):  Temp:  [97.4 °F (36.3 °C)-98.2 °F (36.8 °C)] 98.2 °F (36.8 °C)  Pulse:  [] 94  Resp:  [12-20] 17  SpO2:  [81 %-96 %] 91 %  BP: ()/(58-84) 114/83     Weight: 75.3 kg (166 lb)  Body mass index is 23.15 kg/m².     Physical Exam  Vitals reviewed.   Constitutional:       General: He is not in acute distress.     Appearance: Normal appearance.   HENT:      Head: Normocephalic and atraumatic.      Right Ear: External ear normal.      Left Ear: External ear normal.   Eyes:      Extraocular Movements: Extraocular movements intact.      Pupils: Pupils are equal, round, and reactive to light.   Cardiovascular:      Rate and Rhythm: Normal rate and regular rhythm.      Pulses: Normal pulses.      Heart sounds: Normal heart sounds. No murmur heard.  Pulmonary:      Effort: Pulmonary effort is normal. No respiratory distress.      Breath sounds: Normal breath sounds. No wheezing.   Chest:      Chest wall: No tenderness.   Abdominal:      General: Abdomen is flat.      Palpations: Abdomen is soft. There is no mass.      Tenderness: There is no abdominal tenderness. There is no right CVA tenderness or left CVA tenderness.   Musculoskeletal:         General: No swelling or tenderness. Normal range of motion.   Skin:     General: Skin is warm and dry.      Capillary Refill: Capillary refill takes less than 2 seconds.   Neurological:      General: No focal deficit present.      Mental Status: He is alert and oriented to person, place, and time. Mental  status is at baseline.   Psychiatric:         Mood and Affect: Mood normal.         Thought Content: Thought content normal.              CRANIAL NERVES     CN III, IV, VI   Pupils are equal, round, and reactive to light.       Significant Labs: All pertinent labs within the past 24 hours have been reviewed.    Significant Imaging: I have reviewed all pertinent imaging results/findings within the past 24 hours.

## 2024-05-14 NOTE — ASSESSMENT & PLAN NOTE
Concern for pulmonary HTN and RV overload per echo.  ABG showed pO2 59, normal pH.   Requiring 3L NC since admission.  Management as above.

## 2024-05-14 NOTE — ASSESSMENT & PLAN NOTE
Patient has grade 1 diastolic heart failure and is reportedly on water pill on a daily basis but patient's medication list does not reflect the patient is taking Lasix.  Will start patient on 40 mg of Lasix daily

## 2024-05-14 NOTE — ASSESSMENT & PLAN NOTE
Patient was found to have elevated troponin 403--->1,846 without any ischemic abnormalities seen on EKG or any chest pain or anginal equivalents.  Due to the significant increase in delta troponin.  Patient will be started on ACS protocol.  Cardiology will be consulted

## 2024-05-14 NOTE — ASSESSMENT & PLAN NOTE
Patient with acute kidney injury/acute renal failure likely due to pre-renal azotemia due to IVVD LEIDY is currently stable. Baseline creatinine  0.9  - Labs reviewed- Renal function/electrolytes with Estimated Creatinine Clearance: 47.5 mL/min (A) (based on SCr of 1.65 mg/dL (H)). according to latest data. Monitor urine output and serial BMP and adjust therapy as needed. Avoid nephrotoxins and renally dose meds for GFR listed above.

## 2024-05-14 NOTE — ASSESSMENT & PLAN NOTE
Cause of syncopal episode is unknown at this time.  Initial workup has not elucidate the culprit.  Will proceed with echocardiogram and carotid duplex.  Patient will be monitored in telemetry unit as well

## 2024-05-14 NOTE — PHARMACY MED REC
"Admission Medication History     The home medication history was taken by Mary Mahajan.    You may go to "Admission" then "Reconcile Home Medications" tabs to review and/or act upon these items.     The home medication list has been updated by the Pharmacy department.   Please read ALL comments highlighted in yellow.   Please address this information as you see fit.    Feel free to contact us if you have any questions or require assistance.    The following medications were added:  Etodolac 400 mg  Hydroxyzine 25 mg  Nicotine patch 14 mg/24 hr  Potassium chloride 10 mEq  Sertraline 50 mg  Furosemide 40 mg        Medications listed below were obtained from: Patient/family, Analytic software- [employer], and Medical records  (Not in a hospital admission)        Current Outpatient Medications on File Prior to Encounter   Medication Sig Dispense Refill Last Dose    digoxin (LANOXIN) 125 mcg tablet Take 125 mcg by mouth once daily.   5/13/2024    diltiaZEM (CARDIZEM CD) 180 MG 24 hr capsule Take 180 mg by mouth once daily.   5/13/2024    furosemide (LASIX) 40 MG tablet Take 1 tablet by mouth once daily.   5/13/2024    hydrOXYzine HCL (ATARAX) 25 MG tablet Take 25 mg by mouth nightly as needed (for anxiety/sedation).   Past Week    potassium chloride (KLOR-CON) 10 MEQ TbSR Take 10 mEq by mouth once daily.   Past Week    sertraline (ZOLOFT) 100 MG tablet Take 50 mg by mouth once daily.   Past Week    etodolac (LODINE) 400 MG tablet Take 400 mg by mouth 2 (two) times daily as needed (for pain and inflamation).   Unknown    nicotine (NICODERM CQ) 14 mg/24 hr Place 1 patch onto the skin once daily.   Unknown       Potential issues to be addressed PRIOR TO DISCHARGE  N/A    Mary Mahajan  EXT [id number]                 .          "

## 2024-05-14 NOTE — HPI
Patient is a 64-year-old male with a history of a flutter on rate control but not on any anticoagulation in the setting of chronic grade 1 diastolic dysfunction, essential hypertension, COPD, cirrhosis of liver associated with alcoholism and hepatitis-C who was brought in by EMS after having been found poorly responsive sitting on a porch by his friend.  Last time known normal was approximately 30 minutes.  Patient did not appeared to have had any fecal or urinary incontinence.  Patient also did not appear to have any focal neurological deficits.  When EMS arrived patient exhibit some degree of dysarthria but this shortly was resolved.  Patient however was found to be hypotensive with systolic blood pressure in the 80s and blood pressure normalized after having had received IV fluids EN route to the hospital.    On presentation, vital signs were stable and patient was afebrile.  Workup was notable for presence of elevated proBNP of 7563, and elevated troponin level 403 ---> 1,846 without any ischemic abnormalities seen on EKG.  The rest of the workup in ED was otherwise unremarkable.  Patient will be admitted for further evaluation and intervention

## 2024-05-14 NOTE — SUBJECTIVE & OBJECTIVE
Interval History: Patient seen and examined at the bedside, Dr. Denny present as well. Patient reports shortness of breath.     Review of Systems   Respiratory:  Positive for shortness of breath.      Objective:     Vital Signs (Most Recent):  Temp: 98.4 °F (36.9 °C) (05/14/24 0501)  Pulse: 79 (05/14/24 1019)  Resp: 15 (05/14/24 1019)  BP: 98/67 (05/14/24 1019)  SpO2: 97 % (05/14/24 1019) Vital Signs (24h Range):  Temp:  [97.4 °F (36.3 °C)-98.4 °F (36.9 °C)] 98.4 °F (36.9 °C)  Pulse:  [] 79  Resp:  [10-21] 15  SpO2:  [81 %-98 %] 97 %  BP: ()/(51-84) 98/67     Weight: 75.3 kg (166 lb)  Body mass index is 23.15 kg/m².    Intake/Output Summary (Last 24 hours) at 5/14/2024 1113  Last data filed at 5/14/2024 0728  Gross per 24 hour   Intake 1500 ml   Output --   Net 1500 ml         Physical Exam  Vitals and nursing note reviewed.   Constitutional:       Appearance: He is ill-appearing.   HENT:      Head: Normocephalic and atraumatic.      Nose: Nose normal.      Mouth/Throat:      Mouth: Mucous membranes are moist.   Eyes:      Extraocular Movements: Extraocular movements intact.   Cardiovascular:      Rate and Rhythm: Normal rate and regular rhythm.      Pulses: Normal pulses.      Heart sounds: Normal heart sounds.   Pulmonary:      Effort: Pulmonary effort is normal.      Breath sounds: Wheezing present.      Comments: On 3L NC  Abdominal:      General: Bowel sounds are normal.      Palpations: Abdomen is soft.   Musculoskeletal:         General: Normal range of motion.      Cervical back: Normal range of motion.   Skin:     General: Skin is warm.   Neurological:      General: No focal deficit present.      Mental Status: He is alert and oriented to person, place, and time. Mental status is at baseline.   Psychiatric:         Mood and Affect: Mood normal.         Behavior: Behavior normal.             Significant Labs: All pertinent labs within the past 24 hours have been reviewed.    Significant Imaging:  I have reviewed all pertinent imaging results/findings within the past 24 hours.

## 2024-05-14 NOTE — CONSULTS
CARDIOVASCULAR CONSULTATION        REASON FOR CONSULT:   Steven Crouch is a 64 y.o. male who presents for   Chief Complaint   Patient presents with    Altered Mental Status          HISTORY OF PRESENT ILLNESS:   64 y.o. male who  has a past medical history of A-fib, Hypertension, and Unspecified cirrhosis of liver.    Today we discussed the patient's cardiac symptoms at length following cardiology consult for syncope - the patient stated he was at home on his front porch and essentially the last thing he recalls was bending over to fill his pets' food bowls and he must have passed out. In the ER, CT head NC negative for bleed. Patient's EKG concerning for diffuse ST depression w/ concomitant enzyme elevation - concerning for ischemia (Trop >4000), c/w NSTEMI presentation. TTE showed pronounced PH with accompanying RA/RV dilation, CTA PE protocol negative. We discussed invasive ischemic workup  as patient has been mildly bradycardic and moderately hypotensive since presentation - was not endorsing active chest pain, but stated he felt persistently short of breath. Explained procedure at length, discussed w/ primary team who was in agreement - patient ultimately elected to proceed w/ R/LHC and cor angio for NSTEMI and PH on TTE.  The patient is a current smoker - has quit in the past but resumed smoking several years ago. Unclear precise # of pack year, but >20.     Risks, benefits and alternatives of the catheterization procedure were discussed with the patient.The risks of coronary angiography include but are not limited to: bleeding, infection, death, heart attack, arrhythmia, kidney injury or failure, potential need for dialysis, allergic reactions, stroke, need for emergency surgery, hematoma, pseudoaneurysm etc.  Should stenting be indicated, the patient has agreed to dual anti-platelet therapy for 1-consecutive year with a drug-eluting stent and a minimum of 1-month with the use of a bare metal stent.  Additionally, pt is aware that non-compliance is likely to result in stent clotting with heart attack, heart failure, and/or death  The risks of moderate sedation include hypotension, respiratory depression, arrhythmias, bronchospasm, and death. Informed consent was obtained and the  patient is agreeable to proceed with the procedure. Consent was placed on the chart.      A/P:  65yo M w hx of paroxysmal AF, HTN, current smoker, cirrhosis, presenting with sudden LOC, found to have EKG changes as well as enzyme elevation c/w NSTEMI - has prior only had typical (stable) angina symptoms, no prior cath, TTE demonstrated significant PH - patient going for R/LHC for further ischemic evaluation and treatment as well as further subcategorization of his PH    NSTEMI  -loaded patient w/ brillinta in ER, had already received ASA load  -cont. Heparin gtt until procedure  -hold home medications in acute phase of care; plan to incrementally add e.g. low dose BB, ACEi/ARB etc 12-24h post-procedure as tolerated (please order w/ holding parameters given bradycardia and hypotension at present)  -if PCI performed, will need DAPT for at least 12 mo  -agree w/ high-intensity statin (lipitor 80) - received overnight, cont. Post-procedure      Sinus Bradycardia  -possibly representative of RCA infarct in present context, however may also have tachy-philippe syndrome - if persists w/o reversible cause >48h post cath, can eval for ppm candidacy    Hypotension - presume 2/2 acute on chronic right heart failure, though cirrhosis may also be playing a role here   -hard to estimate contribution from low HR - does not appear to be in cardiogenic shock at present, but is symptomatic when sitting upright/standing  -again, depending on outcome of R/LHC, will address if persists post-procedure    Current Smoker  -following ACS, is Class 1 LAURA A indication for smoking cessation  -recommend combination therapy w/ bupropion 300mg po daily and brand name  "Nicorette "White Ice Mint" 4mg gum prn.   -consider referral to smoking cessation program    Cirrhosis  -unclear severity, no active bleeding recently, somewhat anemic and thrombocytopenic, but no barrier to cath  -defer to primary    LEIDY  -patient presented w/ LEIDY, presume in context of labile BP is 2/2 end-organ hypoperfusion, though not barrier to cath   -patient will receive IVF intraprocedure/post procedure as indicated to mitigate risk of contrast induced nephropathy (cath lab will manage)  -trend Cr/replete lytes, otherwise defer to primary     Thank you for allowing us to assist with the care of this patient. We will continue to follow.         PAST MEDICAL HISTORY:     Past Medical History:   Diagnosis Date    A-fib     Hypertension     Unspecified cirrhosis of liver        PAST SURGICAL HISTORY:   History reviewed. No pertinent surgical history.    ALLERGIES AND MEDICATION:     Review of patient's allergies indicates:   Allergen Reactions    Mobic [meloxicam] Other (See Comments)     Stomach pain         Medication List        ASK your doctor about these medications      digoxin 125 mcg tablet  Commonly known as: LANOXIN     diltiaZEM 180 MG 24 hr capsule  Commonly known as: CARDIZEM CD     etodolac 400 MG tablet  Commonly known as: LODINE     furosemide 40 MG tablet  Commonly known as: LASIX     hydrOXYzine HCL 25 MG tablet  Commonly known as: ATARAX     nicotine 14 mg/24 hr  Commonly known as: NICODERM CQ     potassium chloride 10 MEQ Tbsr  Commonly known as: KLOR-CON     sertraline 100 MG tablet  Commonly known as: ZOLOFT              SOCIAL HISTORY:     Social History     Socioeconomic History    Marital status: Single   Tobacco Use    Smoking status: Never    Smokeless tobacco: Never   Substance and Sexual Activity    Alcohol use: Yes     Alcohol/week: 6.0 standard drinks of alcohol     Types: 6 Cans of beer per week    Drug use: Never       FAMILY HISTORY:   No family history on file.    REVIEW OF " "SYSTEMS:       Cardiology focused 12-point ROS otherwise unremarkable except for as mentioned in HPI and A/P.   Pertinent Positives and Negatives documented herein.       PHYSICAL EXAM:     Vitals:    05/14/24 1235   BP: 111/89   Pulse: 72   Resp: 16   Temp:     Body mass index is 23.15 kg/m².  @FLOWAMB(14)@   @FLOWAMB(11)@     Physical Exam      DATA:     Laboratory:  CBC:  Recent Labs   Lab 01/08/24 1332 05/13/24 1932 05/13/24 2020 05/14/24  0500   WBC 8.73 7.71  --  6.43   Hemoglobin 13.5 11.8 L  --  11.2 L   POC Hematocrit  --   --  38  --    Hematocrit 40.8 37.7 L  --  35.7 L   Platelet Count 170 104 L  --  101 L       CHEMISTRIES:  Recent Labs   Lab 11/03/22  0432 11/09/22  0545 11/12/22  0203 01/08/24 1332 05/13/24 1932 05/14/24  0500   Glucose  --   --   --  86 91 114 H   Sodium 132 L 134 L 131 L 134 L 138 139   Potassium 4.6 4.1 4.3 3.9 4.4 4.5   BUN  --   --   --  20 H 22 H 27 H   Blood Urea Nitrogen 13 17 16  --   --   --    Creatinine 0.85 0.83 0.94 1.05 1.38 H 1.65 H   eGFR  98 98 91  --   --   --    Calcium 8.0 L 7.8 L 8.0 L 8.7 8.8 8.7   Magnesium  --   --   --   --  2.0  --        CARDIAC BIOMARKERS:      No results found for: "BNP"    COAGS:  Recent Labs   Lab 10/28/22  1405 10/30/22  0642 05/14/24  0255   INR 1.8 1.9 1.48       LIPIDS/LFTS:  Recent Labs   Lab 01/08/24 1332 05/13/24 1932 05/14/24  0500   AST 50 H 49 H 79 H   ALT 62 H 28 40       No results found for: "HGBA1C"    TSH        The ASCVD Risk score (Goldy DK, et al., 2019) failed to calculate for the following reasons:    The patient has a prior MI or stroke diagnosis     IMAGING  CTA Chest Non-Coronary (PE Studies)   Final Result      No evidence of pulmonary thromboembolism or other acute process demonstrated.  Hepatic cirrhosis with collateral vessels seen in the right upper abdomen..         Electronically signed by: Jorge Angeles   Date:    05/14/2024   Time:    12:12      US Carotid Bilateral   Final " Result      No convincing sonographic evidence of significant (50% or greater) narrowing of either internal carotid artery.      Indirect NASCET criteria utilized.      Point of Service: [facility]         Electronically signed by: Edinson Wong   Date:    05/14/2024   Time:    07:56      X-Ray Chest AP Portable   Final Result      Mild cardiomegaly.  No esha pulmonary edema or focal consolidating pneumonia.      Point of Service: [facility]         Electronically signed by: Edinson Wong   Date:    05/13/2024   Time:    19:21      CT Head Without Contrast   Final Result      No convincing imaging evidence of acute intracranial abnormality.      The CT exam was performed using one or more of the following dose      reduction techniques- Automated exposure control, adjustment of the mA      and/or kV according to patient size, and/or use of iterative      reconstructed technique.      Point of Service: [facility]         Electronically signed by: Edinson Wong   Date:    05/13/2024   Time:    19:32          ASSESSMENT AND PLAN     Patient Active Problem List   Diagnosis    Atrial flutter    Essential hypertension    COPD (chronic obstructive pulmonary disease)    Cirrhosis of liver    Chronic diastolic heart failure    Syncopal episodes    NSTEMI (non-ST elevated myocardial infarction)    Acute respiratory failure with hypoxia    LEIDY (acute kidney injury)         Orders Placed This Encounter   Procedures    Urine culture    X-Ray Chest AP Portable    CT Head Without Contrast    US Carotid Bilateral    CTA Chest Non-Coronary (PE Studies)    CBC auto differential    Comprehensive metabolic panel    NT-Pro Natriuretic Peptide    Drug Screen, Urine    Magnesium    Urinalysis, Reflex to Urine Culture    Troponin I    CBC with Differential    EXTRA TUBES    Light Blue Top Hold    Gold Top Hold    Ethanol    Troponin I    Digoxin Level    Urinalysis, Microscopic    APTT    Protime-INR     CBC auto differential    Troponin I    CBC with Differential    Comprehensive Metabolic Panel (CMP)    COVID-19 Rapid Screening    APTT    APTT    Diet NPO Except for: Medication, Ice Chips    Draw aPTT level 6 hours after start of infusion; adjust dosage and order aPTT based on the nomogram in hyperlink    Do not administer any intramuscular injections, call physician for an alternative route or medication if medication is needed    If bleeding occurs, or HIT is suspected, stop heparin infusion and contact physician    Tele: Maintain IV access while on telemetry - Adult    Orthostatic blood pressure    Vital signs    Bladder scan    Notify Physician    Place sequential compression device    Establish IV access and maintain saline lock    Hold Enoxaparin/subcutaneous Heparin    Height and weight    Clip and Prep Other (please specifiy) (bilateral wrist/groin)    Verify informed consent    Cardiac Monitoring - Adult    Full code    Inpatient consult to Cardiology    POCT ARTERIAL BLOOD GAS    POCT Arterial Blood Gas-Resp    Inhalation Treatment Q8H    Inhalation Treatment Q8H    Oxygen Continuous    Pulse Oximetry Q4H    EKG 12-lead    EKG 12-lead    Echo    Insert Saline lock IV    Place in Observation    Admit to Inpatient       See below HPI for A/P narrative.               This note was dictated with the help of speech recognition software.  There might be un-intended errors and/or substitutions.

## 2024-05-14 NOTE — ASSESSMENT & PLAN NOTE
It appeared that patient was once diagnosed with cirrhosis of liver associated with alcoholism and hepatitis-C.    However patient no longer drinks and completed hepatitis-C treatment regimen.  T  Total bilirubin level and transaminases levels were almost within normal range, PT/INR 1.5.

## 2024-05-14 NOTE — HOSPITAL COURSE
5/14- Seen patient with Dr. Denny from cardiology, concern for significant RV overload and PE- ordered CTA chest, if negative then patient will proceed with left heart catheterization. Continue heparin infusion.   5/15- CTA negative for PE. Unable to do heart cath yesterday due to extreme anxiety. Started on benzos per [facility] protocol. S/p LHC which showed non-OBS CAD. S/p RHC which showed severe pulm HTN.   5/16- PT/OT consulted, dispo planning in progress.   5/17- Accepted at [facility] for swing bed, discharge today. Patient to follow up with cardiology and pulmonology (Dr. Perez) as outpatient.

## 2024-05-14 NOTE — ASSESSMENT & PLAN NOTE
Clinically appears euvolemic.   Patient has grade 1 diastolic heart failure and is reportedly on water pill on a daily basis but patient's medication list does not reflect the patient is taking Lasix.  Hold on starting PO Lasix for now given soft pressures.

## 2024-05-15 ENCOUNTER — ANESTHESIA (OUTPATIENT)
Dept: CARDIOLOGY | Facility: HOSPITAL | Age: 64
DRG: 280 | End: 2024-05-15
Payer: OTHER GOVERNMENT

## 2024-05-15 ENCOUNTER — ANESTHESIA EVENT (OUTPATIENT)
Dept: CARDIOLOGY | Facility: HOSPITAL | Age: 64
DRG: 280 | End: 2024-05-15
Payer: MEDICARE

## 2024-05-15 PROBLEM — R79.89 ELEVATED TROPONIN: Status: ACTIVE | Noted: 2024-05-15

## 2024-05-15 PROBLEM — I27.20 PULMONARY HTN: Status: ACTIVE | Noted: 2024-05-15

## 2024-05-15 PROBLEM — I48.0 PAROXYSMAL ATRIAL FIBRILLATION: Status: ACTIVE | Noted: 2024-05-15

## 2024-05-15 PROBLEM — I49.5 SINUS BRADY-TACHY SYNDROME: Status: ACTIVE | Noted: 2024-05-15

## 2024-05-15 PROBLEM — F10.232 ALCOHOL DEPENDENCE WITH WITHDRAWAL WITH PERCEPTUAL DISTURBANCE: Status: ACTIVE | Noted: 2024-05-15

## 2024-05-15 PROBLEM — R07.89 OTHER CHEST PAIN: Status: ACTIVE | Noted: 2024-05-15

## 2024-05-15 PROBLEM — I50.42 CHRONIC COMBINED SYSTOLIC AND DIASTOLIC HEART FAILURE: Status: ACTIVE | Noted: 2024-05-14

## 2024-05-15 PROBLEM — R41.82 ALTERED MENTAL STATUS: Status: ACTIVE | Noted: 2024-05-15

## 2024-05-15 PROBLEM — R56.9 SEIZURE AS LATE EFFECT OF CEREBROVASCULAR ACCIDENT (CVA): Status: ACTIVE | Noted: 2024-05-15

## 2024-05-15 PROBLEM — I69.398 SEIZURE AS LATE EFFECT OF CEREBROVASCULAR ACCIDENT (CVA): Status: ACTIVE | Noted: 2024-05-15

## 2024-05-15 LAB
ALBUMIN SERPL BCP-MCNC: 3 G/DL (ref 3.5–5)
ALBUMIN/GLOB SERPL: 0.8 {RATIO}
ALP SERPL-CCNC: 82 U/L (ref 45–115)
ALT SERPL W P-5'-P-CCNC: 122 U/L (ref 16–61)
ANION GAP SERPL CALCULATED.3IONS-SCNC: 9 MMOL/L (ref 7–16)
AST SERPL W P-5'-P-CCNC: 183 U/L (ref 15–37)
BASOPHILS # BLD AUTO: 0.06 K/UL (ref 0–0.2)
BASOPHILS NFR BLD AUTO: 1.1 % (ref 0–1)
BILIRUB SERPL-MCNC: 1.9 MG/DL (ref ?–1.2)
BUN SERPL-MCNC: 29 MG/DL (ref 7–18)
BUN/CREAT SERPL: 21 (ref 6–20)
CALCIUM SERPL-MCNC: 8.7 MG/DL (ref 8.5–10.1)
CHLORIDE SERPL-SCNC: 113 MMOL/L (ref 98–107)
CO2 SERPL-SCNC: 20 MMOL/L (ref 21–32)
CREAT SERPL-MCNC: 1.35 MG/DL (ref 0.7–1.3)
DIFFERENTIAL METHOD BLD: ABNORMAL
EGFR (NO RACE VARIABLE) (RUSH/TITUS): 58 ML/MIN/1.73M2
EOSINOPHIL # BLD AUTO: 0.18 K/UL (ref 0–0.5)
EOSINOPHIL NFR BLD AUTO: 3.2 % (ref 1–4)
ERYTHROCYTE [DISTWIDTH] IN BLOOD BY AUTOMATED COUNT: 17.2 % (ref 11.5–14.5)
GLOBULIN SER-MCNC: 4 G/DL (ref 2–4)
GLUCOSE SERPL-MCNC: 69 MG/DL (ref 74–106)
HCT VFR BLD AUTO: 34.3 % (ref 40–54)
HGB BLD-MCNC: 10.9 G/DL (ref 13.5–18)
IMM GRANULOCYTES # BLD AUTO: 0.02 K/UL (ref 0–0.04)
IMM GRANULOCYTES NFR BLD: 0.4 % (ref 0–0.4)
LYMPHOCYTES # BLD AUTO: 1.1 K/UL (ref 1–4.8)
LYMPHOCYTES NFR BLD AUTO: 19.7 % (ref 27–41)
MCH RBC QN AUTO: 28.9 PG (ref 27–31)
MCHC RBC AUTO-ENTMCNC: 31.8 G/DL (ref 32–36)
MCV RBC AUTO: 91 FL (ref 80–96)
MONOCYTES # BLD AUTO: 0.85 K/UL (ref 0–0.8)
MONOCYTES NFR BLD AUTO: 15.2 % (ref 2–6)
MPC BLD CALC-MCNC: 11.3 FL (ref 9.4–12.4)
NEUTROPHILS # BLD AUTO: 3.37 K/UL (ref 1.8–7.7)
NEUTROPHILS NFR BLD AUTO: 60.4 % (ref 53–65)
NRBC # BLD AUTO: 0 X10E3/UL
NRBC, AUTO (.00): 0 %
PLATELET # BLD AUTO: 100 K/UL (ref 150–400)
POTASSIUM SERPL-SCNC: 4 MMOL/L (ref 3.5–5.1)
PROT SERPL-MCNC: 7 G/DL (ref 6.4–8.2)
RBC # BLD AUTO: 3.77 M/UL (ref 4.6–6.2)
SODIUM SERPL-SCNC: 138 MMOL/L (ref 136–145)
WBC # BLD AUTO: 5.58 K/UL (ref 4.5–11)

## 2024-05-15 PROCEDURE — C1887 CATHETER, GUIDING: HCPCS | Performed by: STUDENT IN AN ORGANIZED HEALTH CARE EDUCATION/TRAINING PROGRAM

## 2024-05-15 PROCEDURE — 25000003 PHARM REV CODE 250: Performed by: STUDENT IN AN ORGANIZED HEALTH CARE EDUCATION/TRAINING PROGRAM

## 2024-05-15 PROCEDURE — 63600175 PHARM REV CODE 636 W HCPCS: Performed by: INTERNAL MEDICINE

## 2024-05-15 PROCEDURE — 63600175 PHARM REV CODE 636 W HCPCS: Performed by: NURSE ANESTHETIST, CERTIFIED REGISTERED

## 2024-05-15 PROCEDURE — 94761 N-INVAS EAR/PLS OXIMETRY MLT: CPT

## 2024-05-15 PROCEDURE — B2111ZZ FLUOROSCOPY OF MULTIPLE CORONARY ARTERIES USING LOW OSMOLAR CONTRAST: ICD-10-PCS | Performed by: STUDENT IN AN ORGANIZED HEALTH CARE EDUCATION/TRAINING PROGRAM

## 2024-05-15 PROCEDURE — 93460 R&L HRT ART/VENTRICLE ANGIO: CPT | Performed by: STUDENT IN AN ORGANIZED HEALTH CARE EDUCATION/TRAINING PROGRAM

## 2024-05-15 PROCEDURE — 99900035 HC TECH TIME PER 15 MIN (STAT)

## 2024-05-15 PROCEDURE — 25000003 PHARM REV CODE 250: Performed by: INTERNAL MEDICINE

## 2024-05-15 PROCEDURE — 36415 COLL VENOUS BLD VENIPUNCTURE: CPT | Performed by: INTERNAL MEDICINE

## 2024-05-15 PROCEDURE — 11000001 HC ACUTE MED/SURG PRIVATE ROOM

## 2024-05-15 PROCEDURE — 25500020 PHARM REV CODE 255: Performed by: STUDENT IN AN ORGANIZED HEALTH CARE EDUCATION/TRAINING PROGRAM

## 2024-05-15 PROCEDURE — 85025 COMPLETE CBC W/AUTO DIFF WBC: CPT | Performed by: INTERNAL MEDICINE

## 2024-05-15 PROCEDURE — D9220A PRA ANESTHESIA: Mod: CRNA,,, | Performed by: NURSE ANESTHETIST, CERTIFIED REGISTERED

## 2024-05-15 PROCEDURE — 37000008 HC ANESTHESIA 1ST 15 MINUTES: Performed by: STUDENT IN AN ORGANIZED HEALTH CARE EDUCATION/TRAINING PROGRAM

## 2024-05-15 PROCEDURE — C1894 INTRO/SHEATH, NON-LASER: HCPCS | Performed by: STUDENT IN AN ORGANIZED HEALTH CARE EDUCATION/TRAINING PROGRAM

## 2024-05-15 PROCEDURE — 4A023N8 MEASUREMENT OF CARDIAC SAMPLING AND PRESSURE, BILATERAL, PERCUTANEOUS APPROACH: ICD-10-PCS | Performed by: STUDENT IN AN ORGANIZED HEALTH CARE EDUCATION/TRAINING PROGRAM

## 2024-05-15 PROCEDURE — 27000221 HC OXYGEN, UP TO 24 HOURS

## 2024-05-15 PROCEDURE — 63600175 PHARM REV CODE 636 W HCPCS: Performed by: STUDENT IN AN ORGANIZED HEALTH CARE EDUCATION/TRAINING PROGRAM

## 2024-05-15 PROCEDURE — 93460 R&L HRT ART/VENTRICLE ANGIO: CPT | Mod: 26,,, | Performed by: STUDENT IN AN ORGANIZED HEALTH CARE EDUCATION/TRAINING PROGRAM

## 2024-05-15 PROCEDURE — 27201423 OPTIME MED/SURG SUP & DEVICES STERILE SUPPLY: Performed by: STUDENT IN AN ORGANIZED HEALTH CARE EDUCATION/TRAINING PROGRAM

## 2024-05-15 PROCEDURE — 80053 COMPREHEN METABOLIC PANEL: CPT | Performed by: INTERNAL MEDICINE

## 2024-05-15 PROCEDURE — D9220A PRA ANESTHESIA: Mod: ANES,,, | Performed by: ANESTHESIOLOGY

## 2024-05-15 PROCEDURE — 94640 AIRWAY INHALATION TREATMENT: CPT

## 2024-05-15 PROCEDURE — 27000284 HC CANNULA NASAL: Performed by: NURSE ANESTHETIST, CERTIFIED REGISTERED

## 2024-05-15 PROCEDURE — 99223 1ST HOSP IP/OBS HIGH 75: CPT | Mod: ,,, | Performed by: HOSPITALIST

## 2024-05-15 PROCEDURE — C1751 CATH, INF, PER/CENT/MIDLINE: HCPCS | Performed by: STUDENT IN AN ORGANIZED HEALTH CARE EDUCATION/TRAINING PROGRAM

## 2024-05-15 PROCEDURE — 37000009 HC ANESTHESIA EA ADD 15 MINS: Performed by: STUDENT IN AN ORGANIZED HEALTH CARE EDUCATION/TRAINING PROGRAM

## 2024-05-15 PROCEDURE — 27000716 HC OXISENSOR PROBE, ANY SIZE: Performed by: NURSE ANESTHETIST, CERTIFIED REGISTERED

## 2024-05-15 PROCEDURE — 25000242 PHARM REV CODE 250 ALT 637 W/ HCPCS: Performed by: INTERNAL MEDICINE

## 2024-05-15 PROCEDURE — 99233 SBSQ HOSP IP/OBS HIGH 50: CPT | Mod: ,,, | Performed by: INTERNAL MEDICINE

## 2024-05-15 RX ORDER — ONDANSETRON HYDROCHLORIDE 2 MG/ML
4 INJECTION, SOLUTION INTRAVENOUS DAILY PRN
Status: DISCONTINUED | OUTPATIENT
Start: 2024-05-15 | End: 2024-05-15 | Stop reason: HOSPADM

## 2024-05-15 RX ORDER — IPRATROPIUM BROMIDE AND ALBUTEROL SULFATE 2.5; .5 MG/3ML; MG/3ML
3 SOLUTION RESPIRATORY (INHALATION) ONCE
Status: DISCONTINUED | OUTPATIENT
Start: 2024-05-15 | End: 2024-05-15 | Stop reason: HOSPADM

## 2024-05-15 RX ORDER — ONDANSETRON 4 MG/1
8 TABLET, ORALLY DISINTEGRATING ORAL EVERY 8 HOURS PRN
Status: DISCONTINUED | OUTPATIENT
Start: 2024-05-15 | End: 2024-05-17 | Stop reason: HOSPADM

## 2024-05-15 RX ORDER — SODIUM CHLORIDE 9 MG/ML
INJECTION, SOLUTION INTRAVENOUS
Status: DISCONTINUED | OUTPATIENT
Start: 2024-05-15 | End: 2024-05-17 | Stop reason: HOSPADM

## 2024-05-15 RX ORDER — PHENYLEPHRINE HYDROCHLORIDE 10 MG/ML
INJECTION INTRAVENOUS
Status: DISCONTINUED | OUTPATIENT
Start: 2024-05-15 | End: 2024-05-15

## 2024-05-15 RX ORDER — THIAMINE HCL 100 MG
100 TABLET ORAL 3 TIMES DAILY
Status: DISCONTINUED | OUTPATIENT
Start: 2024-05-18 | End: 2024-05-17 | Stop reason: HOSPADM

## 2024-05-15 RX ORDER — LORAZEPAM 1 MG/1
2 TABLET ORAL EVERY 4 HOURS PRN
Status: DISCONTINUED | OUTPATIENT
Start: 2024-05-15 | End: 2024-05-17 | Stop reason: HOSPADM

## 2024-05-15 RX ORDER — DIPHENHYDRAMINE HYDROCHLORIDE 50 MG/ML
25 INJECTION INTRAMUSCULAR; INTRAVENOUS EVERY 6 HOURS PRN
Status: DISCONTINUED | OUTPATIENT
Start: 2024-05-15 | End: 2024-05-15 | Stop reason: HOSPADM

## 2024-05-15 RX ORDER — MEPERIDINE HYDROCHLORIDE 25 MG/ML
25 INJECTION INTRAMUSCULAR; INTRAVENOUS; SUBCUTANEOUS EVERY 10 MIN PRN
Status: DISCONTINUED | OUTPATIENT
Start: 2024-05-15 | End: 2024-05-15 | Stop reason: HOSPADM

## 2024-05-15 RX ORDER — HEPARIN SODIUM 1000 [USP'U]/ML
INJECTION, SOLUTION INTRAVENOUS; SUBCUTANEOUS
Status: DISCONTINUED | OUTPATIENT
Start: 2024-05-15 | End: 2024-05-15 | Stop reason: HOSPADM

## 2024-05-15 RX ORDER — MIDAZOLAM HYDROCHLORIDE 1 MG/ML
INJECTION INTRAMUSCULAR; INTRAVENOUS
Status: DISCONTINUED | OUTPATIENT
Start: 2024-05-15 | End: 2024-05-15

## 2024-05-15 RX ORDER — LIDOCAINE HYDROCHLORIDE 10 MG/ML
INJECTION INFILTRATION; PERINEURAL
Status: DISCONTINUED | OUTPATIENT
Start: 2024-05-15 | End: 2024-05-15 | Stop reason: HOSPADM

## 2024-05-15 RX ORDER — MORPHINE SULFATE 10 MG/ML
4 INJECTION INTRAMUSCULAR; INTRAVENOUS; SUBCUTANEOUS EVERY 5 MIN PRN
Status: DISCONTINUED | OUTPATIENT
Start: 2024-05-15 | End: 2024-05-15 | Stop reason: HOSPADM

## 2024-05-15 RX ORDER — ACETAMINOPHEN 325 MG/1
650 TABLET ORAL EVERY 4 HOURS PRN
Status: DISCONTINUED | OUTPATIENT
Start: 2024-05-15 | End: 2024-05-17 | Stop reason: HOSPADM

## 2024-05-15 RX ORDER — OXYCODONE HYDROCHLORIDE 5 MG/1
5 TABLET ORAL EVERY 6 HOURS PRN
Status: DISCONTINUED | OUTPATIENT
Start: 2024-05-15 | End: 2024-05-17 | Stop reason: HOSPADM

## 2024-05-15 RX ORDER — NITROGLYCERIN 5 MG/ML
INJECTION, SOLUTION INTRAVENOUS
Status: DISCONTINUED | OUTPATIENT
Start: 2024-05-15 | End: 2024-05-15 | Stop reason: HOSPADM

## 2024-05-15 RX ORDER — HYDROMORPHONE HYDROCHLORIDE 2 MG/ML
0.5 INJECTION, SOLUTION INTRAMUSCULAR; INTRAVENOUS; SUBCUTANEOUS EVERY 5 MIN PRN
Status: DISCONTINUED | OUTPATIENT
Start: 2024-05-15 | End: 2024-05-15 | Stop reason: HOSPADM

## 2024-05-15 RX ORDER — VERAPAMIL HYDROCHLORIDE 2.5 MG/ML
INJECTION, SOLUTION INTRAVENOUS
Status: DISCONTINUED | OUTPATIENT
Start: 2024-05-15 | End: 2024-05-15 | Stop reason: HOSPADM

## 2024-05-15 RX ORDER — PROPOFOL 10 MG/ML
INJECTION, EMULSION INTRAVENOUS
Status: DISCONTINUED | OUTPATIENT
Start: 2024-05-15 | End: 2024-05-15

## 2024-05-15 RX ADMIN — DIGOXIN 125 MCG: 125 TABLET ORAL at 10:05

## 2024-05-15 RX ADMIN — LEVALBUTEROL HYDROCHLORIDE 1.25 MG: 1.25 SOLUTION RESPIRATORY (INHALATION) at 03:05

## 2024-05-15 RX ADMIN — LEVALBUTEROL HYDROCHLORIDE 1.25 MG: 1.25 SOLUTION RESPIRATORY (INHALATION) at 07:05

## 2024-05-15 RX ADMIN — PROPOFOL 50 MG: 10 INJECTION, EMULSION INTRAVENOUS at 05:05

## 2024-05-15 RX ADMIN — PHENYLEPHRINE HYDROCHLORIDE 200 MCG: 10 INJECTION INTRAVENOUS at 05:05

## 2024-05-15 RX ADMIN — PROPOFOL 50 MG: 10 INJECTION, EMULSION INTRAVENOUS at 04:05

## 2024-05-15 RX ADMIN — SODIUM CHLORIDE: 9 INJECTION, SOLUTION INTRAVENOUS at 12:05

## 2024-05-15 RX ADMIN — MIDAZOLAM 2 MG: 1 INJECTION INTRAMUSCULAR; INTRAVENOUS at 04:05

## 2024-05-15 RX ADMIN — THIAMINE HYDROCHLORIDE 250 MG: 100 INJECTION, SOLUTION INTRAMUSCULAR; INTRAVENOUS at 12:05

## 2024-05-15 RX ADMIN — ASPIRIN 81 MG: 81 TABLET, COATED ORAL at 10:05

## 2024-05-15 RX ADMIN — IPRATROPIUM BROMIDE 0.5 MG: 0.5 SOLUTION RESPIRATORY (INHALATION) at 03:05

## 2024-05-15 RX ADMIN — OXYCODONE 5 MG: 5 TABLET ORAL at 08:05

## 2024-05-15 RX ADMIN — ATORVASTATIN CALCIUM 80 MG: 80 TABLET, FILM COATED ORAL at 08:05

## 2024-05-15 RX ADMIN — IPRATROPIUM BROMIDE 0.5 MG: 0.5 SOLUTION RESPIRATORY (INHALATION) at 07:05

## 2024-05-15 NOTE — ASSESSMENT & PLAN NOTE
Continues to smoke.   Patient is intolerant of albuterol which causes heart palpitation.    Started patient on Xopenex and Atrovent neb treatments

## 2024-05-15 NOTE — ASSESSMENT & PLAN NOTE
Echo    Result Date: 5/14/2024    Left Ventricle: The left ventricle is normal in size. Mildly increased   wall thickness. There is concentric remodeling. Unable to assess wall   motion. There is moderately reduced systolic function. Ejection fraction   by visual approximation is 40%.    Right Ventricle: Severe right ventricular enlargement. Systolic   function is severely reduced.    Left Atrium: Left atrium is mildly dilated.    Right Atrium: Right atrium is severely dilated.    Aortic Valve: The aortic valve is a trileaflet valve.    Tricuspid Valve: There is moderate to severe regurgitation with a   centrally directed jet.    Pulmonic Valve: There is mild regurgitation.    Pulmonary Artery: The estimated pulmonary artery systolic pressure is   56 mmHg.    IVC/SVC: Normal venous pressure at 3 mmHg.       Outpatient follow up.

## 2024-05-15 NOTE — ASSESSMENT & PLAN NOTE
Chronic, controlled. Latest blood pressure and vitals reviewed-     Temp:  [97.4 °F (36.3 °C)-98.4 °F (36.9 °C)]   Pulse:  [73-92]   Resp:  [16-20]   BP: (112-140)/()   SpO2:  [93 %-98 %] .   Home meds for hypertension were reviewed and noted below.   Hypertension Medications               diltiaZEM (CARDIZEM CD) 180 MG 24 hr capsule Take 180 mg by mouth once daily.          Soft pressures noted, hold BP meds for now.     Will utilize p.r.n. blood pressure medication only if patient's blood pressure greater than 180/110 and he develops symptoms such as worsening chest pain or shortness of breath.

## 2024-05-15 NOTE — SUBJECTIVE & OBJECTIVE
Interval History: Patient seen and examined at the bedside. Having some withdrawal symptoms now, tremors and anxiety. Denies chest pain.     Review of Systems   Respiratory:  Positive for shortness of breath.    Neurological:  Positive for tremors.   Psychiatric/Behavioral:  The patient is nervous/anxious.      Objective:     Vital Signs (Most Recent):  Temp: 97.5 °F (36.4 °C) (05/15/24 1146)  Pulse: 88 (05/15/24 1146)  Resp: 19 (05/15/24 1146)  BP: 117/80 (05/15/24 1146)  SpO2: (!) 94 % (05/15/24 1146) Vital Signs (24h Range):  Temp:  [97.4 °F (36.3 °C)-98.4 °F (36.9 °C)] 97.5 °F (36.4 °C)  Pulse:  [73-92] 88  Resp:  [16-20] 19  SpO2:  [93 %-98 %] 94 %  BP: (112-140)/() 117/80     Weight: 75.3 kg (166 lb 0.1 oz)  Body mass index is 23.15 kg/m².    Intake/Output Summary (Last 24 hours) at 5/15/2024 1358  Last data filed at 5/15/2024 0534  Gross per 24 hour   Intake 0 ml   Output --   Net 0 ml         Physical Exam  Vitals and nursing note reviewed.   Constitutional:       Appearance: He is ill-appearing.   HENT:      Head: Normocephalic and atraumatic.      Nose: Nose normal.      Mouth/Throat:      Mouth: Mucous membranes are moist.   Eyes:      Extraocular Movements: Extraocular movements intact.   Cardiovascular:      Rate and Rhythm: Normal rate and regular rhythm.      Pulses: Normal pulses.      Heart sounds: Normal heart sounds.   Pulmonary:      Effort: Pulmonary effort is normal.      Breath sounds: Wheezing present.      Comments: On 3L NC  Abdominal:      General: Bowel sounds are normal.      Palpations: Abdomen is soft.   Musculoskeletal:         General: Normal range of motion.      Cervical back: Normal range of motion.   Skin:     General: Skin is warm.   Neurological:      General: No focal deficit present.      Mental Status: He is alert and oriented to person, place, and time. Mental status is at baseline.      Motor: Tremor present.   Psychiatric:         Mood and Affect: Mood is anxious.              Significant Labs: All pertinent labs within the past 24 hours have been reviewed.    Significant Imaging: I have reviewed all pertinent imaging results/findings within the past 24 hours.

## 2024-05-15 NOTE — ASSESSMENT & PLAN NOTE
Patient with acute kidney injury/acute renal failure likely due to pre-renal azotemia due to IVVD  Baseline creatinine  0.9  - Labs reviewed- Renal function/electrolytes with Estimated Creatinine Clearance: 58.1 mL/min (A) (based on SCr of 1.35 mg/dL (H)). according to latest data.   Improving renal function.   Monitor urine output and serial BMP and adjust therapy as needed. Avoid nephrotoxins and renally dose meds for GFR listed above.

## 2024-05-15 NOTE — PROGRESS NOTES
[facility] Tele[facility] Hospital Medicine  Progress Note    Patient Name: Steven Crouch  MRN: [id number]  Patient Class: IP- Inpatient   Admission Date: 5/13/2024  Length of Stay: 1 days  Attending Physician: Ramesh Guthrie MD  Primary Care Provider: Kymberly, Primary Doctor        Subjective:     Principal Problem:Syncopal episodes        HPI:  Patient is a 64-year-old male with a history of a flutter on rate control but not on any anticoagulation in the setting of chronic grade 1 diastolic dysfunction, essential hypertension, COPD, cirrhosis of liver associated with alcoholism and hepatitis-C who was brought in by EMS after having been found poorly responsive sitting on a porch by his friend.  Last time known normal was approximately 30 minutes.  Patient did not appeared to have had any fecal or urinary incontinence.  Patient also did not appear to have any focal neurological deficits.  When EMS arrived patient exhibit some degree of dysarthria but this shortly was resolved.  Patient however was found to be hypotensive with systolic blood pressure in the 80s and blood pressure normalized after having had received IV fluids EN route to the hospital.    On presentation, vital signs were stable and patient was afebrile.  Workup was notable for presence of elevated proBNP of 7563, and elevated troponin level 403 ---> 1,846 without any ischemic abnormalities seen on EKG.  The rest of the workup in ED was otherwise unremarkable.  Patient will be admitted for further evaluation and intervention    Overview/Hospital Course:  5/14- Seen patient with Dr. Denny from cardiology, concern for significant RV overload and PE- ordered CTA chest, if negative then patient will proceed with left heart catheterization. Continue heparin infusion.   5/15- CTA negative for PE. Unable to do heart cath yesterday due to extreme anxiety. Started on benzos per CIWA protocol. Continue heparin infusion.     Interval  History: Patient seen and examined at the bedside. Having some withdrawal symptoms now, tremors and anxiety. Denies chest pain.     Review of Systems   Respiratory:  Positive for shortness of breath.    Neurological:  Positive for tremors.   Psychiatric/Behavioral:  The patient is nervous/anxious.      Objective:     Vital Signs (Most Recent):  Temp: 97.5 °F (36.4 °C) (05/15/24 1146)  Pulse: 88 (05/15/24 1146)  Resp: 19 (05/15/24 1146)  BP: 117/80 (05/15/24 1146)  SpO2: (!) 94 % (05/15/24 1146) Vital Signs (24h Range):  Temp:  [97.4 °F (36.3 °C)-98.4 °F (36.9 °C)] 97.5 °F (36.4 °C)  Pulse:  [73-92] 88  Resp:  [16-20] 19  SpO2:  [93 %-98 %] 94 %  BP: (112-140)/() 117/80     Weight: 75.3 kg (166 lb 0.1 oz)  Body mass index is 23.15 kg/m².    Intake/Output Summary (Last 24 hours) at 5/15/2024 1358  Last data filed at 5/15/2024 0534  Gross per 24 hour   Intake 0 ml   Output --   Net 0 ml         Physical Exam  Vitals and nursing note reviewed.   Constitutional:       Appearance: He is ill-appearing.   HENT:      Head: Normocephalic and atraumatic.      Nose: Nose normal.      Mouth/Throat:      Mouth: Mucous membranes are moist.   Eyes:      Extraocular Movements: Extraocular movements intact.   Cardiovascular:      Rate and Rhythm: Normal rate and regular rhythm.      Pulses: Normal pulses.      Heart sounds: Normal heart sounds.   Pulmonary:      Effort: Pulmonary effort is normal.      Breath sounds: Wheezing present.      Comments: On 3L NC  Abdominal:      General: Bowel sounds are normal.      Palpations: Abdomen is soft.   Musculoskeletal:         General: Normal range of motion.      Cervical back: Normal range of motion.   Skin:     General: Skin is warm.   Neurological:      General: No focal deficit present.      Mental Status: He is alert and oriented to person, place, and time. Mental status is at baseline.      Motor: Tremor present.   Psychiatric:         Mood and Affect: Mood is anxious.              Significant Labs: All pertinent labs within the past 24 hours have been reviewed.    Significant Imaging: I have reviewed all pertinent imaging results/findings within the past 24 hours.    Assessment/Plan:      * Syncopal episodes  Could be sec to RV infarct leading to hypotension, bradycardia.   Management/workup as below.       NSTEMI (non-ST elevated myocardial infarction)  Patient was found to have elevated troponin 403--->1,846 --> 4,287 without any ischemic abnormalities seen on EKG.  Patient reports exertional dyspnea which will be angina equivalent.   CTA chest negative for PE.   Plan for [facility] today (unable to do it yesterday due to extreme anxiety related to alcohol withdrawal).   Started on heparin infusion, aspirin and statin.   Monitor on tele.       Acute respiratory failure with hypoxia  Concern for pulmonary HTN and RV overload per echo.  ABG showed pO2 59, normal pH.   Requiring 3L NC since admission.  Management as above.     LEIDY (acute kidney injury)  Patient with acute kidney injury/acute renal failure likely due to pre-renal azotemia due to IVVD  Baseline creatinine  0.9  - Labs reviewed- Renal function/electrolytes with Estimated Creatinine Clearance: 58.1 mL/min (A) (based on SCr of 1.35 mg/dL (H)). according to latest data.   Improving renal function.   Monitor urine output and serial BMP and adjust therapy as needed. Avoid nephrotoxins and renally dose meds for GFR listed above.    Alcohol dependence with withdrawal with perceptual disturbance  On CIWA protocol, thiamine, Ativan.   Monitor for DT's.       Paroxysmal atrial fibrillation  Rate is adequately controlled on diltiazem and digoxin, but patient is not on any anticoagulation with CHADS-VASc score of 3 and has bled score of 3.  Reportedly, patient [facility] a [facility] at [facility] on a regular basis  Resume digoxin, continue IV heparin, hold diltiazem given soft pressures.   Monitor on tele.    Pulmonary HTN  Echo    Result Date:  5/14/2024    Left Ventricle: The left ventricle is normal in size. Mildly increased   wall thickness. There is concentric remodeling. Unable to assess wall   motion. There is moderately reduced systolic function. Ejection fraction   by visual approximation is 40%.    Right Ventricle: Severe right ventricular enlargement. Systolic   function is severely reduced.    Left Atrium: Left atrium is mildly dilated.    Right Atrium: Right atrium is severely dilated.    Aortic Valve: The aortic valve is a trileaflet valve.    Tricuspid Valve: There is moderate to severe regurgitation with a   centrally directed jet.    Pulmonic Valve: There is mild regurgitation.    Pulmonary Artery: The estimated pulmonary artery systolic pressure is   56 mmHg.    IVC/SVC: Normal venous pressure at 3 mmHg.       Outpatient follow up.       Chronic combined systolic and diastolic heart failure  Echo    Result Date: 5/14/2024    Left Ventricle: The left ventricle is normal in size. Mildly increased   wall thickness. There is concentric remodeling. Unable to assess wall   motion. There is moderately reduced systolic function. Ejection fraction   by visual approximation is 40%.    Right Ventricle: Severe right ventricular enlargement. Systolic   function is severely reduced.    Left Atrium: Left atrium is mildly dilated.    Right Atrium: Right atrium is severely dilated.    Aortic Valve: The aortic valve is a trileaflet valve.    Tricuspid Valve: There is moderate to severe regurgitation with a   centrally directed jet.    Pulmonic Valve: There is mild regurgitation.    Pulmonary Artery: The estimated pulmonary artery systolic pressure is   56 mmHg.    IVC/SVC: Normal venous pressure at 3 mmHg.         Clinically appears euvolemic.   Patient has grade 1 diastolic heart failure and is reportedly on water pill on a daily basis but patient's medication list does not reflect the patient is taking Lasix.  Hold on starting PO Lasix for now given  soft pressures/LEIDY.       Cirrhosis of liver  It appeared that patient was once diagnosed with cirrhosis of liver associated with alcoholism and hepatitis-C.    Patient continues to drink.   Total bilirubin level and transaminases levels were almost within normal range, PT/INR 1.5.    COPD (chronic obstructive pulmonary disease)  Continues to smoke.   Patient is intolerant of albuterol which causes heart palpitation.    Started patient on Xopenex and Atrovent neb treatments    Essential hypertension    Chronic, controlled. Latest blood pressure and vitals reviewed-     Temp:  [97.4 °F (36.3 °C)-98.4 °F (36.9 °C)]   Pulse:  [73-92]   Resp:  [16-20]   BP: (112-140)/()   SpO2:  [93 %-98 %] .   Home meds for hypertension were reviewed and noted below.   Hypertension Medications               diltiaZEM (CARDIZEM CD) 180 MG 24 hr capsule Take 180 mg by mouth once daily.          Soft pressures noted, hold BP meds for now.     Will utilize p.r.n. blood pressure medication only if patient's blood pressure greater than 180/110 and he develops symptoms such as worsening chest pain or shortness of breath.      Atrial flutter  Rate is adequately controlled on diltiazem and digoxin, but patient is not on any anticoagulation with CHADS-VASc score of 3 and has bled score of 3.  Reportedly, patient sees a cardiologist at [facility] on a regular basis  Resume digoxin, continue IV heparin, hold diltiazem given soft pressures.   Monitor on tele.        VTE Risk Mitigation (From admission, onward)           Ordered     Reason for No Pharmacological VTE Prophylaxis  Once        Comments: Patient is on ACS protocol with heparin infusion   Question:  Reasons:  Answer:  Physician Provided (leave comment)    05/14/24 0205     IP VTE HIGH RISK PATIENT  Once         05/14/24 0205     Place sequential compression device  Until discontinued         05/14/24 0205     heparin 25,000 units in dextrose 5% 250 mL (100 units/mL) infusion LOW INTENSITY  nomogram - [facility]  Continuous        Question:  Begin at (units/kg/hr)  Answer:  12    05/14/24 0159     heparin 25,000 units in dextrose 5% (100 units/ml) IV bolus from bag LOW INTENSITY nomogram - [facility]  As needed (PRN)        Question:  Heparin Infusion Adjustment (DO NOT MODIFY ANSWER)  Answer:  [email or web address]\[email or web address]\Images\Pharmacy\HeparinInfusions\heparin LOW INTENSITY nomogram for [facility] [id number]    05/14/24 0159     heparin 25,000 units in dextrose 5% (100 units/ml) IV bolus from bag LOW INTENSITY nomogram - [facility]  As needed (PRN)        Question:  Heparin Infusion Adjustment (DO NOT MODIFY ANSWER)  Answer:  \\[email or web address]\epic\Images\Pharmacy\HeparinInfusions\heparin LOW INTENSITY nomogram for [facility] [id number]    05/14/24 0159                    Discharge Planning   REENA: 5/17/2024     Code Status: Full Code   Is the patient medically ready for discharge?:     Reason for patient still in hospital (select all that apply): Patient trending condition and Consult recommendations  Discharge Plan A: Home with family                  PENG STARK MD  Department of Hospital Medicine   [facility] - [facility]

## 2024-05-15 NOTE — PLAN OF CARE
Problem: Adult Inpatient Plan of Care  Goal: Plan of Care Review  Outcome: Progressing  Flowsheets (Taken 5/14/2024 6242)  Plan of Care Reviewed With: patient  Goal: Patient-Specific Goal (Individualized)  Outcome: Progressing  Goal: Absence of Hospital-Acquired Illness or Injury  Outcome: Progressing  Goal: Optimal Comfort and Wellbeing  Outcome: Progressing  Goal: Readiness for Transition of Care  Outcome: Progressing     Problem: Acute Kidney Injury/Impairment  Goal: Fluid and Electrolyte Balance  Outcome: Progressing  Goal: Improved Oral Intake  Outcome: Progressing  Goal: Effective Renal Function  Outcome: Progressing

## 2024-05-15 NOTE — ASSESSMENT & PLAN NOTE
It appeared that patient was once diagnosed with cirrhosis of liver associated with alcoholism and hepatitis-C.    Patient continues to drink.   Total bilirubin level and transaminases levels were almost within normal range, PT/INR 1.5.

## 2024-05-15 NOTE — ANESTHESIA PREPROCEDURE EVALUATION
05/15/2024  Stveen Crouch is a 64 y.o., male.      Pre-op Assessment    I have reviewed the Patient Summary Reports.     I have reviewed the Nursing Notes. I have reviewed the NPO Status.   I have reviewed the Medications.     Review of Systems  Anesthesia Hx:             Denies Family Hx of Anesthesia complications.    Denies Personal Hx of Anesthesia complications.                    Social:  Non-Smoker, No Alcohol Use       Cardiovascular:     Hypertension  Past MI (NSTEMI (non-ST elevated myocardial infarction))                             Hx of Myocardial Infarction                  Hypertension     Atrial Fibrillation, Atrial Flutter     Pulmonary:   COPD         Chronic Obstructive Pulmonary Disease (COPD):                      Renal/:  Chronic Renal Disease        Kidney Function/Disease             Hepatic/GI:      Liver Disease,         Liver Disease        Musculoskeletal:  Musculoskeletal Normal                Neurological:       Seizures           Seizure Disorder                          Psych:  Psychiatric History                  Physical Exam  General: Well nourished, Cooperative, Alert and Oriented    Airway:  Mallampati: II / II  Mouth Opening: Normal  TM Distance: Normal  Neck ROM: Normal ROM    Dental:  Intact    Chest/Lungs:  Clear to auscultation    Heart:  Rate: Normal  Rhythm: Regular Rhythm  Sounds: Normal        Chemistry        Component Value Date/Time     05/15/2024 0533     (L) 11/12/2022 0203    K 4.0 05/15/2024 0533    K 4.3 11/12/2022 0203     (H) 05/15/2024 0533     11/12/2022 0203    CO2 20 (L) 05/15/2024 0533    CO2 23 11/12/2022 0203    BUN 29 (H) 05/15/2024 0533    BUN 16 11/12/2022 0203    CREATININE 1.35 (H) 05/15/2024 0533    CREATININE 0.94 11/12/2022 0203    GLU 69 (L) 05/15/2024 0533        Component Value Date/Time    CALCIUM 8.7  05/15/2024 0533    CALCIUM 8.0 (L) 11/12/2022 0203    ALKPHOS 82 05/15/2024 0533     (H) 05/15/2024 0533     (H) 05/15/2024 0533    BILITOT 1.9 (H) 05/15/2024 0533    ESTGFRAFRICA 91 11/12/2022 0203        Lab Results   Component Value Date    WBC 5.58 05/15/2024    HGB 10.9 (L) 05/15/2024    HCT 34.3 (L) 05/15/2024     (L) 05/15/2024     No results found for this or any previous visit.    Echo Summary 2024         Left Ventricle: The left ventricle is normal in size. Mildly increased wall thickness. There is concentric remodeling. Unable to assess wall motion. There is moderately reduced systolic function. Ejection fraction by visual approximation is 40%.    Right Ventricle: Severe right ventricular enlargement. Systolic function is severely reduced.    Left Atrium: Left atrium is mildly dilated.    Right Atrium: Right atrium is severely dilated.    Aortic Valve: The aortic valve is a trileaflet valve.    Tricuspid Valve: There is moderate to severe regurgitation with a centrally directed jet.    Pulmonic Valve: There is mild regurgitation.    Pulmonary Artery: The estimated pulmonary artery systolic pressure is 56 mmHg.    IVC/SVC: Normal venous pressure at 3 mmHg.    Anesthesia Plan  Type of Anesthesia, risks & benefits discussed:    Anesthesia Type: Gen Natural Airway, MAC  Intra-op Monitoring Plan: Standard ASA Monitors  Post Op Pain Control Plan: multimodal analgesia  Induction:  IV  Airway Plan: Direct  Informed Consent: Informed consent signed with the Patient and all parties understand the risks and agree with anesthesia plan.  All questions answered.   ASA Score: 4  Day of Surgery Review of History & Physical: H&P Update referred to the surgeon/provider.I have interviewed and examined the patient. I have reviewed the patient's H&P dated: There are no significant changes.     Ready For Surgery From Anesthesia Perspective.     .

## 2024-05-15 NOTE — ASSESSMENT & PLAN NOTE
Echo    Result Date: 5/14/2024    Left Ventricle: The left ventricle is normal in size. Mildly increased   wall thickness. There is concentric remodeling. Unable to assess wall   motion. There is moderately reduced systolic function. Ejection fraction   by visual approximation is 40%.    Right Ventricle: Severe right ventricular enlargement. Systolic   function is severely reduced.    Left Atrium: Left atrium is mildly dilated.    Right Atrium: Right atrium is severely dilated.    Aortic Valve: The aortic valve is a trileaflet valve.    Tricuspid Valve: There is moderate to severe regurgitation with a   centrally directed jet.    Pulmonic Valve: There is mild regurgitation.    Pulmonary Artery: The estimated pulmonary artery systolic pressure is   56 mmHg.    IVC/SVC: Normal venous pressure at 3 mmHg.         Clinically appears euvolemic.   Patient has grade 1 diastolic heart failure and is reportedly on water pill on a daily basis but patient's medication list does not reflect the patient is taking Lasix.  Hold on starting PO Lasix for now given soft pressures/LEIDY.

## 2024-05-15 NOTE — OR NURSING
1740-Pt rec'd to PACU stable condition, drowsy, responds to stimulation, resp slightly labored, O2@2L via nc, SaO2-94%, IV fluids infusing, dressing to RIJ with small amount of sanguineous drainage noted, TR band noted to right wrist, inflated, bilateral radial pulses noted, no c/o pain, will con't to monitor, safety measures in effect.    1745-ETCO2 applied.    1800-RIJ transparent dressing applied by cath nurse Do(RN).    1810-Pt awake/alert, no c/o pain, no c/o pain, no bleeding to RIJ site, TR band remains inflated, will con't to monitor.    1820-Pt care released to [place]), pt awake, vss oral temp 98, resp-12, hr-92,  SaO2-95% on 2L O2 via nc, b/p 146/65.

## 2024-05-15 NOTE — ASSESSMENT & PLAN NOTE
Patient was found to have elevated troponin 403--->1,846 --> 4,287 without any ischemic abnormalities seen on EKG.  Patient reports exertional dyspnea which will be angina equivalent.   CTA chest negative for PE.   Plan for [facility] today (unable to do it yesterday due to extreme anxiety related to alcohol withdrawal).   Started on heparin infusion, aspirin and statin.   Monitor on tele.

## 2024-05-16 PROBLEM — I42.8 NICM (NONISCHEMIC CARDIOMYOPATHY): Status: ACTIVE | Noted: 2024-05-16

## 2024-05-16 PROBLEM — I21.A1 TYPE 2 MYOCARDIAL INFARCTION: Status: ACTIVE | Noted: 2024-05-14

## 2024-05-16 PROBLEM — R79.89 ELEVATED LFTS: Status: ACTIVE | Noted: 2024-05-16

## 2024-05-16 LAB
ALBUMIN SERPL BCP-MCNC: 2.9 G/DL (ref 3.5–5)
ALBUMIN/GLOB SERPL: 0.7 {RATIO}
ALP SERPL-CCNC: 89 U/L (ref 45–115)
ALT SERPL W P-5'-P-CCNC: 128 U/L (ref 16–61)
ANION GAP SERPL CALCULATED.3IONS-SCNC: 9 MMOL/L (ref 7–16)
AST SERPL W P-5'-P-CCNC: 161 U/L (ref 15–37)
BILIRUB SERPL-MCNC: 1.6 MG/DL (ref ?–1.2)
BUN SERPL-MCNC: 22 MG/DL (ref 7–18)
BUN/CREAT SERPL: 18 (ref 6–20)
CALCIUM SERPL-MCNC: 8.6 MG/DL (ref 8.5–10.1)
CHLORIDE SERPL-SCNC: 114 MMOL/L (ref 98–107)
CO2 SERPL-SCNC: 21 MMOL/L (ref 21–32)
CREAT SERPL-MCNC: 1.21 MG/DL (ref 0.7–1.3)
EGFR (NO RACE VARIABLE) (RUSH/TITUS): 66 ML/MIN/1.73M2
GLOBULIN SER-MCNC: 4.2 G/DL (ref 2–4)
GLUCOSE SERPL-MCNC: 89 MG/DL (ref 74–106)
POTASSIUM SERPL-SCNC: 4 MMOL/L (ref 3.5–5.1)
PROT SERPL-MCNC: 7.1 G/DL (ref 6.4–8.2)
SODIUM SERPL-SCNC: 140 MMOL/L (ref 136–145)
UA COMPLETE W REFLEX CULTURE PNL UR: NO GROWTH

## 2024-05-16 PROCEDURE — 25000003 PHARM REV CODE 250: Performed by: INTERNAL MEDICINE

## 2024-05-16 PROCEDURE — 99232 SBSQ HOSP IP/OBS MODERATE 35: CPT | Mod: GC,,, | Performed by: INTERNAL MEDICINE

## 2024-05-16 PROCEDURE — 36415 COLL VENOUS BLD VENIPUNCTURE: CPT | Performed by: INTERNAL MEDICINE

## 2024-05-16 PROCEDURE — 11000001 HC ACUTE MED/SURG PRIVATE ROOM

## 2024-05-16 PROCEDURE — 27000221 HC OXYGEN, UP TO 24 HOURS

## 2024-05-16 PROCEDURE — 94640 AIRWAY INHALATION TREATMENT: CPT

## 2024-05-16 PROCEDURE — 80053 COMPREHEN METABOLIC PANEL: CPT | Performed by: INTERNAL MEDICINE

## 2024-05-16 PROCEDURE — 94761 N-INVAS EAR/PLS OXIMETRY MLT: CPT

## 2024-05-16 PROCEDURE — 97161 PT EVAL LOW COMPLEX 20 MIN: CPT

## 2024-05-16 PROCEDURE — 99900035 HC TECH TIME PER 15 MIN (STAT)

## 2024-05-16 PROCEDURE — 63600175 PHARM REV CODE 636 W HCPCS: Performed by: INTERNAL MEDICINE

## 2024-05-16 PROCEDURE — 25000003 PHARM REV CODE 250: Performed by: NURSE PRACTITIONER

## 2024-05-16 PROCEDURE — 99233 SBSQ HOSP IP/OBS HIGH 50: CPT | Mod: ,,, | Performed by: NURSE PRACTITIONER

## 2024-05-16 PROCEDURE — 97165 OT EVAL LOW COMPLEX 30 MIN: CPT

## 2024-05-16 PROCEDURE — 25000242 PHARM REV CODE 250 ALT 637 W/ HCPCS: Performed by: INTERNAL MEDICINE

## 2024-05-16 RX ORDER — ENOXAPARIN SODIUM 100 MG/ML
40 INJECTION SUBCUTANEOUS EVERY 24 HOURS
Status: DISCONTINUED | OUTPATIENT
Start: 2024-05-16 | End: 2024-05-17 | Stop reason: HOSPADM

## 2024-05-16 RX ORDER — METOPROLOL SUCCINATE 25 MG/1
25 TABLET, EXTENDED RELEASE ORAL DAILY
Status: DISCONTINUED | OUTPATIENT
Start: 2024-05-16 | End: 2024-05-17 | Stop reason: HOSPADM

## 2024-05-16 RX ADMIN — SODIUM CHLORIDE: 9 INJECTION, SOLUTION INTRAVENOUS at 09:05

## 2024-05-16 RX ADMIN — OXYCODONE 5 MG: 5 TABLET ORAL at 09:05

## 2024-05-16 RX ADMIN — DIGOXIN 125 MCG: 125 TABLET ORAL at 09:05

## 2024-05-16 RX ADMIN — IPRATROPIUM BROMIDE 0.5 MG: 0.5 SOLUTION RESPIRATORY (INHALATION) at 03:05

## 2024-05-16 RX ADMIN — IPRATROPIUM BROMIDE 0.5 MG: 0.5 SOLUTION RESPIRATORY (INHALATION) at 12:05

## 2024-05-16 RX ADMIN — ATORVASTATIN CALCIUM 80 MG: 80 TABLET, FILM COATED ORAL at 08:05

## 2024-05-16 RX ADMIN — LEVALBUTEROL HYDROCHLORIDE 1.25 MG: 1.25 SOLUTION RESPIRATORY (INHALATION) at 12:05

## 2024-05-16 RX ADMIN — ENOXAPARIN SODIUM 40 MG: 40 INJECTION SUBCUTANEOUS at 05:05

## 2024-05-16 RX ADMIN — OXYCODONE 5 MG: 5 TABLET ORAL at 06:05

## 2024-05-16 RX ADMIN — IPRATROPIUM BROMIDE 0.5 MG: 0.5 SOLUTION RESPIRATORY (INHALATION) at 07:05

## 2024-05-16 RX ADMIN — ASPIRIN 81 MG: 81 TABLET, COATED ORAL at 09:05

## 2024-05-16 RX ADMIN — METOPROLOL SUCCINATE 25 MG: 25 TABLET, EXTENDED RELEASE ORAL at 02:05

## 2024-05-16 RX ADMIN — THIAMINE HYDROCHLORIDE 250 MG: 100 INJECTION, SOLUTION INTRAMUSCULAR; INTRAVENOUS at 09:05

## 2024-05-16 RX ADMIN — LEVALBUTEROL HYDROCHLORIDE 1.25 MG: 1.25 SOLUTION RESPIRATORY (INHALATION) at 07:05

## 2024-05-16 RX ADMIN — LEVALBUTEROL HYDROCHLORIDE 1.25 MG: 1.25 SOLUTION RESPIRATORY (INHALATION) at 03:05

## 2024-05-16 NOTE — ASSESSMENT & PLAN NOTE
NSTEMI  -loaded patient w/ brillinta in ER, had already received ASA load  -cont. Heparin gtt until procedure  -hold home medications in acute phase of care; plan to incrementally add e.g. low dose BB, ACEi/ARB etc 12-24h post-procedure as tolerated (please order w/ holding parameters given bradycardia and hypotension at present)  -if PCI performed, will need DAPT for at least 12 mo  -agree w/ high-intensity statin (lipitor 80) - received overnight, cont. Post-procedure    5/16/2024:  - LHC with mild nonobstructive CAD; RHC with normal left and right sided filling pressures  - Type 2 demand ischemia  - NICM; EF 40%, severe RV enlargement with severely reduced systolic function, mod-severe TR, normal CVP  - Added Toprol XL 25 daily, if BP tolerates can add Losartan 12.5 daily tomorrow (no MRA or SGLT2 at this time due to low BP)  - Would restart home lasix PRN for weight gain, edema  - Follow up with cardiology in 2 weeks  - Cardiology will sign off at this time, please call if any further assistance is needed

## 2024-05-16 NOTE — PLAN OF CARE
Problem: Adult Inpatient Plan of Care  Goal: Plan of Care Review  Outcome: Progressing  Flowsheets (Taken 5/16/2024 1146)  Plan of Care Reviewed With: patient  Goal: Patient-Specific Goal (Individualized)  Outcome: Progressing  Flowsheets (Taken 5/16/2024 1146)  Individualized Care Needs: pain  Anxieties, Fears or Concerns: pain  Patient/Family-Specific Goals (Include Timeframe): pain  Goal: Absence of Hospital-Acquired Illness or Injury  Outcome: Progressing  Intervention: Identify and Manage Fall Risk  Flowsheets (Taken 5/16/2024 1146)  Safety Promotion/Fall Prevention:   assistive device/personal item within reach   nonskid shoes/socks when out of bed  Intervention: Prevent Skin Injury  Flowsheets (Taken 5/16/2024 1146)  Body Position: position changed independently  Skin Protection: (pt is continent of bowel and bladder) other (see comments)  Intervention: Prevent and Manage VTE (Venous Thromboembolism) Risk  Flowsheets (Taken 5/16/2024 1146)  VTE Prevention/Management: remove, assess skin, and reapply sequential compression device  Goal: Optimal Comfort and Wellbeing  Outcome: Progressing  Goal: Readiness for Transition of Care  Outcome: Progressing     Problem: Acute Kidney Injury/Impairment  Goal: Fluid and Electrolyte Balance  Outcome: Progressing  Goal: Improved Oral Intake  Outcome: Progressing  Goal: Effective Renal Function  Outcome: Progressing

## 2024-05-16 NOTE — PROGRESS NOTES
[facility] - [facility] Tele[facility] Hospital Medicine  Progress Note    Patient Name: Steven Crouch  MRN: [id number]  Patient Class: IP- Inpatient   Admission Date: 5/13/2024  Length of Stay: 2 days  Attending Physician: Ramesh Guthrie MD  Primary Care Provider: Kymberly, Primary Doctor        Subjective:     Principal Problem:Syncopal episodes        HPI:  Patient is a 64-year-old male with a history of a flutter on rate control but not on any anticoagulation in the setting of chronic grade 1 diastolic dysfunction, essential hypertension, COPD, cirrhosis of liver associated with alcoholism and hepatitis-C who was brought in by EMS after having been found poorly responsive sitting on a porch by his friend.  Last time known normal was approximately 30 minutes.  Patient did not appeared to have had any fecal or urinary incontinence.  Patient also did not appear to have any focal neurological deficits.  When EMS arrived patient exhibit some degree of dysarthria but this shortly was resolved.  Patient however was found to be hypotensive with systolic blood pressure in the 80s and blood pressure normalized after having had received IV fluids EN route to the hospital.    On presentation, vital signs were stable and patient was afebrile.  Workup was notable for presence of elevated proBNP of 7563, and elevated troponin level 403 ---> 1,846 without any ischemic abnormalities seen on EKG.  The rest of the workup in ED was otherwise unremarkable.  Patient will be admitted for further evaluation and intervention    Overview/Hospital Course:  5/14- Seen patient with Dr. Denny from cardiology, concern for significant RV overload and PE- ordered CTA chest, if negative then patient will proceed with left heart catheterization. Continue heparin infusion.   5/15- CTA negative for PE. Unable to do heart cath yesterday due to extreme anxiety. Started on benzos per CIWA protocol. S/p LHC which showed non-OBS CAD. S/p RHC  which showed severe pulm HTN.   5/16- PT/OT consulted, dispo planning in progress.     Interval History: Patient seen and examined at the bedside. Feels better, less short of breath.     Review of Systems   Respiratory:  Positive for shortness of breath.    Neurological:  Positive for tremors.   Psychiatric/Behavioral:  The patient is nervous/anxious.      Objective:     Vital Signs (Most Recent):  Temp: 98.3 °F (36.8 °C) (05/16/24 1137)  Pulse: 76 (05/16/24 1137)  Resp: 20 (05/16/24 1137)  BP: 122/71 (05/16/24 1137)  SpO2: (!) 94 % (05/16/24 1137) Vital Signs (24h Range):  Temp:  [97.4 °F (36.3 °C)-98.6 °F (37 °C)] 98.3 °F (36.8 °C)  Pulse:  [] 76  Resp:  [12-20] 20  SpO2:  [92 %-98 %] 94 %  BP: ()/(63-88) 122/71     Weight: 75.3 kg (166 lb 0.1 oz)  Body mass index is 23.15 kg/m².    Intake/Output Summary (Last 24 hours) at 5/16/2024 1316  Last data filed at 5/15/2024 1838  Gross per 24 hour   Intake 330.71 ml   Output 5 ml   Net 325.71 ml         Physical Exam  Vitals and nursing note reviewed.   Constitutional:       Appearance: He is ill-appearing.   HENT:      Head: Normocephalic and atraumatic.      Nose: Nose normal.      Mouth/Throat:      Mouth: Mucous membranes are moist.   Eyes:      Extraocular Movements: Extraocular movements intact.   Cardiovascular:      Rate and Rhythm: Normal rate and regular rhythm.      Pulses: Normal pulses.      Heart sounds: Normal heart sounds.   Pulmonary:      Effort: Pulmonary effort is normal.      Breath sounds: Wheezing present.      Comments: On 3L NC  Abdominal:      General: Bowel sounds are normal.      Palpations: Abdomen is soft.   Musculoskeletal:         General: Normal range of motion.      Cervical back: Normal range of motion.   Skin:     General: Skin is warm.   Neurological:      General: No focal deficit present.      Mental Status: He is alert and oriented to person, place, and time. Mental status is at baseline.   Psychiatric:         Mood and  Affect: Mood normal.             Significant Labs: All pertinent labs within the past 24 hours have been reviewed.    Significant Imaging: I have reviewed all pertinent imaging results/findings within the past 24 hours.    Assessment/Plan:      * Syncopal episodes  Could be sec to RV infarct leading to hypotension, bradycardia.   Management/workup as below.       Type 2 myocardial infarction  Patient was found to have elevated troponin 403--->1,846 --> 4,287 without any ischemic abnormalities seen on EKG.  Patient reports exertional dyspnea which will be angina equivalent.   CTA chest negative for PE.   LHC with non-OBS CAD.  Unclear etiology for troponin leak.   Continue aspirin and statin.   Monitor on tele.       Acute respiratory failure with hypoxia  Concern for pulmonary HTN and RV overload per echo.  ABG showed pO2 59, normal pH.   Requiring 3L NC since admission.  Management as above.   Home O2 eval prior to discharge.     Severe pulmonary hypertension  Echo    Result Date: 5/14/2024    Left Ventricle: The left ventricle is normal in size. Mildly increased   wall thickness. There is concentric remodeling. Unable to assess wall   motion. There is moderately reduced systolic function. Ejection fraction   by visual approximation is 40%.    Right Ventricle: Severe right ventricular enlargement. Systolic   function is severely reduced.    Left Atrium: Left atrium is mildly dilated.    Right Atrium: Right atrium is severely dilated.    Aortic Valve: The aortic valve is a trileaflet valve.    Tricuspid Valve: There is moderate to severe regurgitation with a   centrally directed jet.    Pulmonic Valve: There is mild regurgitation.    Pulmonary Artery: The estimated pulmonary artery systolic pressure is   56 mmHg.    IVC/SVC: Normal venous pressure at 3 mmHg.       S/p RHC: Normal right and left sided filling pressures with severe pulmonary HTN; RA 4mmHg, PA 73/28/43mmHg   Outpatient follow up with Dr. Perez,  eventually will require referral to Pulmonary HTN clinic.       Chronic combined systolic and diastolic heart failure  Echo    Result Date: 5/14/2024    Left Ventricle: The left ventricle is normal in size. Mildly increased   wall thickness. There is concentric remodeling. Unable to assess wall   motion. There is moderately reduced systolic function. Ejection fraction   by visual approximation is 40%.    Right Ventricle: Severe right ventricular enlargement. Systolic   function is severely reduced.    Left Atrium: Left atrium is mildly dilated.    Right Atrium: Right atrium is severely dilated.    Aortic Valve: The aortic valve is a trileaflet valve.    Tricuspid Valve: There is moderate to severe regurgitation with a   centrally directed jet.    Pulmonic Valve: There is mild regurgitation.    Pulmonary Artery: The estimated pulmonary artery systolic pressure is   56 mmHg.    IVC/SVC: Normal venous pressure at 3 mmHg.         Clinically appears euvolemic.   Patient has grade 1 diastolic heart failure and is reportedly on water pill on a daily basis but patient's medication list does not reflect the patient is taking Lasix.  Will await cardiology recommendations regarding GDMT.       Alcohol dependence with withdrawal with perceptual disturbance  On CIWA protocol, thiamine, Ativan.   Monitor for DT's.       LEIDY (acute kidney injury)  Patient with acute kidney injury/acute renal failure likely due to pre-renal azotemia due to IVVD  Baseline creatinine  0.9  - Labs reviewed- Renal function/electrolytes with Estimated Creatinine Clearance: 64.8 mL/min (based on SCr of 1.21 mg/dL). according to latest data.   Improving renal function.   Monitor urine output and serial BMP and adjust therapy as needed. Avoid nephrotoxins and renally dose meds for GFR listed above.    Elevated LFTs  Sec to alcohol use disorder.  Trend LFT's.       Paroxysmal atrial fibrillation  Rate is adequately controlled on diltiazem and digoxin, but  patient is not on any anticoagulation with CHADS-VASc score of 3 and has bled score of 3.  Reportedly, patient [facility] a [facility] at [facility] a regular basis  Resume digoxin, hold diltiazem given soft pressures.   Not on AC at home.   Monitor on tele.    Cirrhosis of liver  It appeared that patient was once diagnosed with cirrhosis of liver associated with alcoholism and hepatitis-C.    Patient continues to drink.   Total bilirubin level and transaminases levels were almost within normal range, PT/INR 1.5.    COPD (chronic obstructive pulmonary disease)  Continues to smoke.   Patient is intolerant of albuterol which causes heart palpitation.    Started patient on Xopenex and Atrovent neb treatments    Essential hypertension    Chronic, controlled. Latest blood pressure and vitals reviewed-     Temp:  [97.4 °F (36.3 °C)-98.6 °F (37 °C)]   Pulse:  []   Resp:  [12-20]   BP: ()/(63-88)   SpO2:  [92 %-98 %] .   Home meds for hypertension were reviewed and noted below.   Hypertension Medications               diltiaZEM (CARDIZEM CD) 180 MG 24 hr capsule Take 180 mg by mouth once daily.          Soft pressures noted, hold BP meds for now since pressures stable.     Will utilize p.r.n. blood pressure medication only if patient's blood pressure greater than 180/110 and he develops symptoms such as worsening chest pain or shortness of breath.      Atrial flutter  Rate is adequately controlled on diltiazem and digoxin, but patient is not on any anticoagulation with CHADS-VASc score of 3 and has bled score of 3.  Reportedly, patient sees a cardiologist at [facility] on a regular basis  Resume digoxin, continue IV heparin, hold diltiazem given soft pressures.   Monitor on tele.        VTE Risk Mitigation (From admission, onward)           Ordered     enoxaparin injection 40 mg  Every 24 hours         05/16/24 1312     Reason for No Pharmacological VTE Prophylaxis  Once        Comments: Patient is on ACS protocol with heparin  infusion   Question:  Reasons:  Answer:  Physician Provided (leave comment)    05/14/24 0205     IP VTE HIGH RISK PATIENT  Once         05/14/24 0205     Place sequential compression device  Until discontinued         05/14/24 0205                    Discharge Planning   REENA: 5/17/2024     Code Status: Full Code   Is the patient medically ready for discharge?:     Reason for patient still in hospital (select all that apply): Patient trending condition and Consult recommendations  Discharge Plan A: Home with family                  PENG STARK MD  Department of Hospital Medicine   [facility] - [facility]

## 2024-05-16 NOTE — ASSESSMENT & PLAN NOTE
Chronic, controlled. Latest blood pressure and vitals reviewed-     Temp:  [97.4 °F (36.3 °C)-98.6 °F (37 °C)]   Pulse:  []   Resp:  [12-20]   BP: ()/(63-88)   SpO2:  [92 %-98 %] .   Home meds for hypertension were reviewed and noted below.   Hypertension Medications               diltiaZEM (CARDIZEM CD) 180 MG 24 hr capsule Take 180 mg by mouth once daily.          Soft pressures noted, hold BP meds for now since pressures stable.     Will utilize p.r.n. blood pressure medication only if patient's blood pressure greater than 180/110 and he develops symptoms such as worsening chest pain or shortness of breath.

## 2024-05-16 NOTE — SUBJECTIVE & OBJECTIVE
Interval History: Patient seen and examined at the bedside. Feels better, less short of breath.     Review of Systems   Respiratory:  Positive for shortness of breath.    Neurological:  Positive for tremors.   Psychiatric/Behavioral:  The patient is nervous/anxious.      Objective:     Vital Signs (Most Recent):  Temp: 98.3 °F (36.8 °C) (05/16/24 1137)  Pulse: 76 (05/16/24 1137)  Resp: 20 (05/16/24 1137)  BP: 122/71 (05/16/24 1137)  SpO2: (!) 94 % (05/16/24 1137) Vital Signs (24h Range):  Temp:  [97.4 °F (36.3 °C)-98.6 °F (37 °C)] 98.3 °F (36.8 °C)  Pulse:  [] 76  Resp:  [12-20] 20  SpO2:  [92 %-98 %] 94 %  BP: ()/(63-88) 122/71     Weight: 75.3 kg (166 lb 0.1 oz)  Body mass index is 23.15 kg/m².    Intake/Output Summary (Last 24 hours) at 5/16/2024 1316  Last data filed at 5/15/2024 1838  Gross per 24 hour   Intake 330.71 ml   Output 5 ml   Net 325.71 ml         Physical Exam  Vitals and nursing note reviewed.   Constitutional:       Appearance: He is ill-appearing.   HENT:      Head: Normocephalic and atraumatic.      Nose: Nose normal.      Mouth/Throat:      Mouth: Mucous membranes are moist.   Eyes:      Extraocular Movements: Extraocular movements intact.   Cardiovascular:      Rate and Rhythm: Normal rate and regular rhythm.      Pulses: Normal pulses.      Heart sounds: Normal heart sounds.   Pulmonary:      Effort: Pulmonary effort is normal.      Breath sounds: Wheezing present.      Comments: On 3L NC  Abdominal:      General: Bowel sounds are normal.      Palpations: Abdomen is soft.   Musculoskeletal:         General: Normal range of motion.      Cervical back: Normal range of motion.   Skin:     General: Skin is warm.   Neurological:      General: No focal deficit present.      Mental Status: He is alert and oriented to person, place, and time. Mental status is at baseline.   Psychiatric:         Mood and Affect: Mood normal.             Significant Labs: All pertinent labs within the past  24 hours have been reviewed.    Significant Imaging: I have reviewed all pertinent imaging results/findings within the past 24 hours.

## 2024-05-16 NOTE — ASSESSMENT & PLAN NOTE
Concern for pulmonary HTN and RV overload per echo.  ABG showed pO2 59, normal pH.   Requiring 3L NC since admission.  Management as above.   Home O2 eval prior to discharge.

## 2024-05-16 NOTE — PT/OT/SLP EVAL
"Physical Therapy Evaluation and Treatment    Patient Name: Steven Crouch   MRN: [id number]  Recent Surgery: Procedure(s) (LRB):  CATHETERIZATION, HEART, BOTH LEFT AND RIGHT (N/A) 1 Day Post-Op    Recommendations:     Discharge Recommendations: Low Intensity Therapy (to moderate intensity)   Discharge Equipment Recommendations: none   Barriers to discharge: Increased level of assist and Ongoing medical treatment    Assessment:     Steven Crouch is a 64 y.o. male admitted with a medical diagnosis of Syncopal episodes; EtOH abuse with possible DTs He presents with the following impairments/functional limitations: impaired endurance, impaired self care skills, impaired functional mobility, gait instability, weakness, impaired balance, impaired cognition, pain, decreased safety awareness.   Patient mildly confused but able to follow commands for PT interventions. Patient demonstrates decreased functional mobility from reported baseline and will benefit from skilled PT ot safely progress activities. D/c disposition of home with HHPT vs swing bed to be determined by rate of recovery.    Rehab Prognosis: Good; patient would benefit from acute PT services to address these deficits and reach maximum level of function.    Plan:     During this hospitalization, patient to be seen 5 x/week to address the above listed problems via gait training, therapeutic activities, therapeutic exercises    Plan of Care Expires: 06/16/24    Subjective     Chief Complaint: Syncopal episodes   Patient Comments/Goals: "I am sore all over from all these tests especially my groin and my chest."  Pain/Comfort:  Pain Rating 1: 7/10  Location 1: groin (lateral chest)  Pain Addressed 1: Reposition, Distraction, Cessation of Activity, Nurse notified  Pain Rating Post-Intervention 1: 5/10    Social History:  Living Environment: Patient states he lives alone with his dog. The  note says he was living with some friends and will go home with " a different friend when medically stable for d/c. Single level home with 3 steps/rail to enter.   Prior Level of Function: Prior to admission, patient was modified independent with ADLs using straight cane for mobility at times  Equipment Used at Home: cane, straight  DME owned (not currently used): none  Assistance Upon Discharge:  friend and home health vs swing bed staff    Objective:     Communicated with Spencer Ahuja LPN prior to session. Patient found right sidelying with oxygen, peripheral IV, telemetry upon PT entry to room. Covers and gown askew and urinal dumped on the floor.    General Precautions: Standard, fall   Orthopedic Precautions: N/A   Braces: N/A    Respiratory Status: Nasal cannula, flow 2 L/min    Exams:  Cognition: Patient is oriented to Person and Place  RLE ROM: WFL  RLE Strength: Deficits: 4+/5  LLE ROM: WFL  LLE Strength: Deficits: 4+/5  Sensation:    -       Intact  Skin Integrity/Edema:     -       evidence of prior injury/surgery left tib/fib    Functional Mobility:  Gait belt applied - Yes  Bed Mobility  Rolling Left: modified independence  Rolling Right: modified independence  Supine to Sit: modified independence for increased time and effort  Sit to Supine: modified independence for increased time and effort  Transfers  Sit to Stand: stand by assistance with no AD and with cues for sequencing and safety  Gait  Patient ambulated 40' with no AD and stand by assistance. Patient demonstrates occasional unsteady gait, decreased step length, wide base of support, decreased foot clearance, decreased ela, and mild path deviations . No acute LOB or lightheadedness. All lines remained intact throughout ambulation trail.  Balance  Sitting: modified independence  Standing: stand by assistance      Therapeutic Activities and Exercises:   Patient educated on role of acute care PT and PT POC, safety while in hospital including calling nurse for mobility, and call light usage  Patient  educated about importance of OOB mobility and remaining up in chair most of the day.      AM-PAC 6 CLICK MOBILITY  Total Score:20    Patient left right sidelying with all lines intact, call button in reach, RN notified, and bed alarm on.    GOALS:   Multidisciplinary Problems       Physical Therapy Goals          Problem: Physical Therapy    Goal Priority Disciplines Outcome Goal Variances Interventions   Physical Therapy Goal     PT, PT/OT Progressing     Description: Short Term Goals to be met by: 2024    Patient will increase functional independence with mobility by performin. Supine to sit with independently  2. Sit to stand transfer with independently using no assistive device  3. Bed to chair transfer with independently using no assistive device  4. Gait  x 100 feet with independently using no assistive device  5. Lower extremity exercise program x30 reps per handout, with assistance as needed    Long Term Goals to be met by: 2024    Pt will regain full independent functional mobility with lowest level of assistive device to return to home situation and prior activities of daily living.                        History:     Past Medical History:   Diagnosis Date    A-fib     Hypertension     Unspecified cirrhosis of liver        History reviewed. No pertinent surgical history.    Time Tracking:     PT Received On: 24  PT Start Time: 911  PT Stop Time: 928  PT Total Time (min): 17 min     Billable Minutes: Evaluation Low complexity    2024

## 2024-05-16 NOTE — ASSESSMENT & PLAN NOTE
Echo    Result Date: 5/14/2024    Left Ventricle: The left ventricle is normal in size. Mildly increased   wall thickness. There is concentric remodeling. Unable to assess wall   motion. There is moderately reduced systolic function. Ejection fraction   by visual approximation is 40%.    Right Ventricle: Severe right ventricular enlargement. Systolic   function is severely reduced.    Left Atrium: Left atrium is mildly dilated.    Right Atrium: Right atrium is severely dilated.    Aortic Valve: The aortic valve is a trileaflet valve.    Tricuspid Valve: There is moderate to severe regurgitation with a   centrally directed jet.    Pulmonic Valve: There is mild regurgitation.    Pulmonary Artery: The estimated pulmonary artery systolic pressure is   56 mmHg.    IVC/SVC: Normal venous pressure at 3 mmHg.       S/p RHC: Normal right and left sided filling pressures with severe pulmonary HTN; RA 4mmHg, PA 73/28/43mmHg   Outpatient follow up with Dr. Perez, eventually will require referral to Pulmonary HTN clinic.

## 2024-05-16 NOTE — PLAN OF CARE
Problem: Occupational Therapy  Goal: Occupational Therapy Goal  Description: STG:  Pt will perform grooming with setup  Pt will bathe with Fanny with setup at EOB  Pt will perform UE dressing with Jefferson  Pt will perform LE dressing with Fanny  Pt will sit EOB x 10 min with SBA   Pt will transfer bed/chair/bsc with CGA with RW  Pt will perform standing task x 2 min with CGA with RW   Pt will tolerate 15 minutes of tx without fatigue      LT.Restore to max I with self care and mobility.      Outcome: Progressing

## 2024-05-16 NOTE — ANESTHESIA POSTPROCEDURE EVALUATION
Anesthesia Post Evaluation    Patient: Steven Crouch    Procedure(s) Performed: Procedure(s) (LRB):  CATHETERIZATION, HEART, BOTH LEFT AND RIGHT (N/A)    Final Anesthesia Type: general      Patient location during evaluation: PACU  Patient participation: Yes- Able to Participate  Level of consciousness: awake and alert  Post-procedure vital signs: reviewed and stable  Pain management: adequate  Airway patency: patent  TARUN mitigation strategies: Multimodal analgesia  PONV status at discharge: No PONV  Anesthetic complications: no      Cardiovascular status: blood pressure returned to baseline  Respiratory status: unassisted  Hydration status: euvolemic  Follow-up not needed.              Vitals Value Taken Time   /69 05/16/24 0820   Temp 36.7 °C (98 °F) 05/16/24 0820   Pulse 89 05/16/24 0820   Resp 18 05/16/24 0951   SpO2 95 % 05/16/24 0820         Event Time   Out of Recovery 05/15/2024 18:10:00         Pain/Moon Score: Pain Rating Prior to Med Admin: 8 (5/16/2024  9:51 AM)  Pain Rating Post Med Admin: 0 (5/16/2024  7:18 AM)  Moon Score: 10 (5/16/2024  7:18 AM)

## 2024-05-16 NOTE — ASSESSMENT & PLAN NOTE
Rate is adequately controlled on diltiazem and digoxin, but patient is not on any anticoagulation with CHADS-VASc score of 3 and has bled score of 3.  Reportedly, patient sees a [facility]ologist at [facility] on a regular basis  Resume digoxin, hold diltiazem given soft pressures.   Not on AC at home.   Monitor on tele.

## 2024-05-16 NOTE — ASSESSMENT & PLAN NOTE
Echo    Result Date: 5/14/2024    Left Ventricle: The left ventricle is normal in size. Mildly increased   wall thickness. There is concentric remodeling. Unable to assess wall   motion. There is moderately reduced systolic function. Ejection fraction   by visual approximation is 40%.    Right Ventricle: Severe right ventricular enlargement. Systolic   function is severely reduced.    Left Atrium: Left atrium is mildly dilated.    Right Atrium: Right atrium is severely dilated.    Aortic Valve: The aortic valve is a trileaflet valve.    Tricuspid Valve: There is moderate to severe regurgitation with a   centrally directed jet.    Pulmonic Valve: There is mild regurgitation.    Pulmonary Artery: The estimated pulmonary artery systolic pressure is   56 mmHg.    IVC/SVC: Normal venous pressure at 3 mmHg.         Clinically appears euvolemic.   Patient has grade 1 diastolic heart failure and is reportedly on water pill on a daily basis but patient's medication list does not reflect the patient is taking Lasix.  Will await cardiology recommendations regarding GDMT.

## 2024-05-16 NOTE — PROGRESS NOTES
[facility] - [facility] Telemetry  Cardiology  Progress Note    Patient Name: Steven Crouch  MRN: [id number]  Admission Date: 5/13/2024  Hospital Length of Stay: 2 days  Code Status: Full Code   Attending Physician: Ramesh Guthrie MD   Primary Care Physician: Kymberly, Primary Doctor  Expected Discharge Date: 5/17/2024  Principal Problem:Syncopal episodes    Subjective:     Hospital Course:   No notes on file    Interval History: Patient seen today s/p R & LHC yesterday which revealed mild nonobstructive CAD and Normal right and left sided filling pressures with severe pulmonary HTN, normal CO.    Review of Systems   Constitutional: Negative for chills and fever.   Cardiovascular:  Negative for chest pain, dyspnea on exertion and orthopnea.   Respiratory:  Negative for shortness of breath.      Objective:     Vital Signs (Most Recent):  Temp: 98.3 °F (36.8 °C) (05/16/24 1137)  Pulse: 76 (05/16/24 1137)  Resp: 20 (05/16/24 1137)  BP: 122/71 (05/16/24 1137)  SpO2: (!) 94 % (05/16/24 1137) Vital Signs (24h Range):  Temp:  [97.4 °F (36.3 °C)-98.6 °F (37 °C)] 98.3 °F (36.8 °C)  Pulse:  [] 76  Resp:  [12-20] 20  SpO2:  [92 %-98 %] 94 %  BP: ()/(63-88) 122/71     Weight: 75.3 kg (166 lb 0.1 oz)  Body mass index is 23.15 kg/m².     SpO2: (!) 94 %         Intake/Output Summary (Last 24 hours) at 5/16/2024 1332  Last data filed at 5/15/2024 1838  Gross per 24 hour   Intake 330.71 ml   Output 5 ml   Net 325.71 ml       Lines/Drains/Airways       Peripheral Intravenous Line  Duration                  Peripheral IV - Single Lumen 05/13/24 20 G Left;Posterior Hand 3 days                       Physical Exam  Vitals reviewed.   Constitutional:       General: He is not in acute distress.  Eyes:      Pupils: Pupils are equal, round, and reactive to light.   Cardiovascular:      Rate and Rhythm: Normal rate and regular rhythm.   Pulmonary:      Effort: Pulmonary effort is normal.      Breath sounds: Normal  "breath sounds.   Abdominal:      General: Bowel sounds are normal.      Palpations: Abdomen is soft.   Neurological:      Mental Status: He is alert and oriented to person, place, and time.            Significant Labs: ABG: No results for input(s): "PH", "PCO2", "HCO3", "POCSATURATED", "BE" in the last 48 hours., Blood Culture: No results for input(s): "LABBLOO" in the last 48 hours., BMP:   Recent Labs   Lab 05/15/24  0533 05/16/24  0439   GLU 69* 89    140   K 4.0 4.0   * 114*   CO2 20* 21   BUN 29* 22*   CREATININE 1.35* 1.21   CALCIUM 8.7 8.6   , CMP   Recent Labs   Lab 05/15/24  0533 05/16/24  0439    140   K 4.0 4.0   * 114*   CO2 20* 21   GLU 69* 89   BUN 29* 22*   CREATININE 1.35* 1.21   CALCIUM 8.7 8.6   PROT 7.0 7.1   ALBUMIN 3.0* 2.9*   BILITOT 1.9* 1.6*   ALKPHOS 82 89   * 161*   * 128*   ANIONGAP 9 9   , CBC   Recent Labs   Lab 05/15/24  0533   WBC 5.58   HGB 10.9*   HCT 34.3*   *   , Lipid Panel No results for input(s): "CHOL", "HDL", "LDLCALC", "TRIG", "CHOLHDL" in the last 48 hours., and Troponin No results for input(s): "TROPONINI" in the last 48 hours.    Significant Imaging: Cardiac Cath: Results for orders placed during the hospital encounter of 05/13/24    Cardiac catheterization (Needs Review)  This result has not been signed. Information might be incomplete.    Conclusion    The left ventricular end diastolic pressure was elevated.    The pre-procedure left ventricular end diastolic pressure was 5.    The estimated blood loss was none.    There was non-obstructive coronary artery disease..    Mild non-obstructive CAD  Normal right and left sided filling pressures with severe pulmonary HTN; RA 4mmHg, PA 73/28/43mmHg and LVEDP 5mmHg  Normal systemic flow estimation CO/CI; 4.9/5.6 by Jacob and 4.9/2.5 by TD    Plan:  Workup underlying pulmonary HTN    The procedure log was documented by Documenter: Cruzito Schaeffer and verified by Jason Dean, " MD.    Date: 5/15/2024  Time: 5:28 PM      and Echocardiogram: Transthoracic echo (TTE) complete (Cupid Only):   Results for orders placed or performed during the hospital encounter of 05/13/24   Echo   Result Value Ref Range    BSA 1.94 m2    LVOT stroke volume 35.91 cm3    LVIDd 2.77 (A) 3.5 - 6.0 cm    LV Systolic Volume 10.79 mL    LV Systolic Volume Index 5.5 mL/m2    LVIDs 1.87 (A) 2.1 - 4.0 cm    LV Diastolic Volume 28.74 mL    LV Diastolic Volume Index 14.74 mL/m2    IVS 1.61 (A) 0.6 - 1.1 cm    LVOT diameter 2.03 cm    LVOT area 3.2 cm2    FS 32 28 - 44 %    Left Ventricle Relative Wall Thickness 1.06 cm    Posterior Wall 1.47 (A) 0.6 - 1.1 cm    LV mass 149.48 g    LV Mass Index 77 g/m2    MV Peak E Juice 0.41 m/s    TDI LATERAL 0.07 m/s    TDI SEPTAL 0.04 m/s    E/E' ratio 7.45 m/s    MV Peak A Juice 0.69 m/s    TR Max Juice 3.65 m/s    E/A ratio 0.59     E wave deceleration time 203.56 msec    LV SEPTAL E/E' RATIO 10.25 m/s    LV LATERAL E/E' RATIO 5.86 m/s    LVOT peak juice 0.70 m/s    Left Ventricular Outflow Tract Mean Velocity 0.49 cm/s    Left Ventricular Outflow Tract Mean Gradient 1.10 mmHg    Right ventricular length in diastole (apical 4-chamber view) 7.41 cm    RV mid diameter 4.57 cm    TAPSE 2.17 cm    LA volume (mod) 39.21 cm3    LA Volume Index (Mod) 20.1 mL/m2    RA area 35.7 cm2    Right Atrium Volume Systolic 175.50 mL    AV mean gradient 2 mmHg    AV peak gradient 3 mmHg    Ao peak juice 0.89 m/s    Ao VTI 13.10 cm    LVOT peak VTI 11.10 cm    AV valve area 2.74 cm²    AV Velocity Ratio 0.79     AV index (prosthetic) 0.85     NAYELI by Velocity Ratio 2.54 cm²    MV stenosis pressure 1/2 time 59.03 ms    MV valve area p 1/2 method 3.73 cm2    Triscuspid Valve Regurgitation Peak Gradient 53 mmHg    PV PEAK VELOCITY 0.64 m/s    PV peak gradient 2 mmHg    Ao root annulus 2.30 cm    IVC diameter 2.62 cm    Mean e' 0.06 m/s    ZLVIDS -4.77     ZLVIDD -6.97     AORTIC VALVE CUSP SEPERATION 2.17 cm    RVDD  5.79 cm    RV-rose mid d 4.6 cm    RV-rose length 7.4 cm    RV/LV Ratio 2.09 cm    EF 40 %    TV resting pulmonary artery pressure 56 mmHg    RV TB RVSP 7 mmHg    Est. RA pres 3 mmHg    Narrative      Left Ventricle: The left ventricle is normal in size. Mildly increased   wall thickness. There is concentric remodeling. Unable to assess wall   motion. There is moderately reduced systolic function. Ejection fraction   by visual approximation is 40%.    Right Ventricle: Severe right ventricular enlargement. Systolic   function is severely reduced.    Left Atrium: Left atrium is mildly dilated.    Right Atrium: Right atrium is severely dilated.    Aortic Valve: The aortic valve is a trileaflet valve.    Tricuspid Valve: There is moderate to severe regurgitation with a   centrally directed jet.    Pulmonic Valve: There is mild regurgitation.    Pulmonary Artery: The estimated pulmonary artery systolic pressure is   56 mmHg.    IVC/SVC: Normal venous pressure at 3 mmHg.       Assessment and Plan:     Brief HPI: 64 y.o. male who  has a past medical history of A-fib, Hypertension, and Unspecified cirrhosis of liver.     Today we discussed the patient's cardiac symptoms at length following cardiology consult for syncope - the patient stated he was at home on his front porch and essentially the last thing he recalls was bending over to fill his pets' food bowls and he must have passed out. In the ER, CT head NC negative for bleed. Patient's EKG concerning for diffuse ST depression w/ concomitant enzyme elevation - concerning for ischemia (Trop >4000), c/w NSTEMI presentation. TTE showed pronounced PH with accompanying RA/RV dilation, CTA PE protocol negative. We discussed invasive ischemic workup  as patient has been mildly bradycardic and moderately hypotensive since presentation - was not endorsing active chest pain, but stated he felt persistently short of breath. Explained procedure at length, discussed w/ primary team  who was in agreement - patient ultimately elected to proceed w/ R/LHC and cor angio for NSTEMI and PH on TTE.  The patient is a current smoker - has quit in the past but resumed smoking several years ago. Unclear precise # of pack year, but >20.      Risks, benefits and alternatives of the catheterization procedure were discussed with the patient.The risks of coronary angiography include but are not limited to: bleeding, infection, death, heart attack, arrhythmia, kidney injury or failure, potential need for dialysis, allergic reactions, stroke, need for emergency surgery, hematoma, pseudoaneurysm etc.  Should stenting be indicated, the patient has agreed to dual anti-platelet therapy for 1-consecutive year with a drug-eluting stent and a minimum of 1-month with the use of a bare metal stent. Additionally, pt is aware that non-compliance is likely to result in stent clotting with heart attack, heart failure, and/or death  The risks of moderate sedation include hypotension, respiratory depression, arrhythmias, bronchospasm, and death. Informed consent was obtained and the  patient is agreeable to proceed with the procedure. Consent was placed on the chart.     A/P:  63yo M w hx of paroxysmal AF, HTN, current smoker, cirrhosis, presenting with sudden LOC, found to have EKG changes as well as enzyme elevation c/w NSTEMI - has prior only had typical (stable) angina symptoms, no prior cath, TTE demonstrated significant PH - patient going for R/LHC for further ischemic evaluation and treatment as well as further subcategorization of his PH    * Syncopal episodes  5/16/2024:  - CTA negative for PE  - Being followed by primary medical team    Alcohol dependence with withdrawal with perceptual disturbance  - Being followed by primary team    Paroxysmal atrial fibrillation  5/16/2024:  - On home digoxin  - Not on [facility], [facility]  - EKG SR, tele ordered but according to nurse pt tore his wires up and  threw monitor in the trash, refused to wear it    Severe pulmonary hypertension  5/16/2024:  - RHC: RA 4mmHg, PA 73/28/43mmHg and LVEDP 5mmHg   - To follow up with pulmonology outpatient    Type 2 myocardial infarction  NSTEMI  -loaded patient w/ brillinta in ER, had already received ASA load  -cont. Heparin gtt until procedure  -hold home medications in acute phase of care; plan to incrementally add e.g. low dose BB, ACEi/ARB etc 12-24h post-procedure as tolerated (please order w/ holding parameters given bradycardia and hypotension at present)  -if PCI performed, will need DAPT for at least 12 mo  -agree w/ high-intensity statin (lipitor 80) - received overnight, cont. Post-procedure    5/16/2024:  - LHC with mild nonobstructive CAD; RHC with normal left and right sided filling pressures  - Type 2 demand ischemia  - NICM; EF 40%, severe RV enlargement with severely reduced systolic function, mod-severe TR, normal CVP  - Added Toprol XL 25 daily, if BP tolerates can add Losartan 12.5 daily tomorrow (no MRA or SGLT2 at this time due to low BP)  - Would restart home lasix PRN for weight gain, edema  - Follow up with cardiology in 2 weeks  - Cardiology will sign off at this time, please call if any further assistance is needed    Cirrhosis of liver  - Being followed by primary medical team        VTE Risk Mitigation (From admission, onward)           Ordered     enoxaparin injection 40 mg  Every 24 hours         05/16/24 1312     Reason for No Pharmacological VTE Prophylaxis  Once        Comments: Patient is on ACS protocol with heparin infusion   Question:  Reasons:  Answer:  Physician Provided (leave comment)    05/14/24 0205     IP VTE HIGH RISK PATIENT  Once         05/14/24 0205     Place sequential compression device  Until discontinued         05/14/24 0205                    Thu Johnston, JENNIFER  Cardiology  [facility] - [address] [place] [address]

## 2024-05-16 NOTE — SUBJECTIVE & OBJECTIVE
"Interval History: Patient seen today s/p R & LHC yesterday which revealed mild nonobstructive CAD and Normal right and left sided filling pressures with severe pulmonary HTN, normal CO.    Review of Systems   Constitutional: Negative for chills and fever.   Cardiovascular:  Negative for chest pain, dyspnea on exertion and orthopnea.   Respiratory:  Negative for shortness of breath.      Objective:     Vital Signs (Most Recent):  Temp: 98.3 °F (36.8 °C) (05/16/24 1137)  Pulse: 76 (05/16/24 1137)  Resp: 20 (05/16/24 1137)  BP: 122/71 (05/16/24 1137)  SpO2: (!) 94 % (05/16/24 1137) Vital Signs (24h Range):  Temp:  [97.4 °F (36.3 °C)-98.6 °F (37 °C)] 98.3 °F (36.8 °C)  Pulse:  [] 76  Resp:  [12-20] 20  SpO2:  [92 %-98 %] 94 %  BP: ()/(63-88) 122/71     Weight: 75.3 kg (166 lb 0.1 oz)  Body mass index is 23.15 kg/m².     SpO2: (!) 94 %         Intake/Output Summary (Last 24 hours) at 5/16/2024 1332  Last data filed at 5/15/2024 1838  Gross per 24 hour   Intake 330.71 ml   Output 5 ml   Net 325.71 ml       Lines/Drains/Airways       Peripheral Intravenous Line  Duration                  Peripheral IV - Single Lumen 05/13/24 20 G Left;Posterior Hand 3 days                       Physical Exam  Vitals reviewed.   Constitutional:       General: He is not in acute distress.  Eyes:      Pupils: Pupils are equal, round, and reactive to light.   Cardiovascular:      Rate and Rhythm: Normal rate and regular rhythm.   Pulmonary:      Effort: Pulmonary effort is normal.      Breath sounds: Normal breath sounds.   Abdominal:      General: Bowel sounds are normal.      Palpations: Abdomen is soft.   Neurological:      Mental Status: He is alert and oriented to person, place, and time.            Significant Labs: ABG: No results for input(s): "PH", "PCO2", "HCO3", "POCSATURATED", "BE" in the last 48 hours., Blood Culture: No results for input(s): "LABBLOO" in the last 48 hours., BMP:   Recent Labs   Lab 05/15/24  0532 " "05/16/24 0439   GLU 69* 89    140   K 4.0 4.0   * 114*   CO2 20* 21   BUN 29* 22*   CREATININE 1.35* 1.21   CALCIUM 8.7 8.6   , CMP   Recent Labs   Lab 05/15/24  0533 05/16/24 0439    140   K 4.0 4.0   * 114*   CO2 20* 21   GLU 69* 89   BUN 29* 22*   CREATININE 1.35* 1.21   CALCIUM 8.7 8.6   PROT 7.0 7.1   ALBUMIN 3.0* 2.9*   BILITOT 1.9* 1.6*   ALKPHOS 82 89   * 161*   * 128*   ANIONGAP 9 9   , CBC   Recent Labs   Lab 05/15/24  0533   WBC 5.58   HGB 10.9*   HCT 34.3*   *   , Lipid Panel No results for input(s): "CHOL", "HDL", "LDLCALC", "TRIG", "CHOLHDL" in the last 48 hours., and Troponin No results for input(s): "TROPONINI" in the last 48 hours.    Significant Imaging: Cardiac Cath: Results for orders placed during the hospital encounter of 05/13/24    Cardiac catheterization (Needs Review)  This result has not been signed. Information might be incomplete.    Conclusion    The left ventricular end diastolic pressure was elevated.    The pre-procedure left ventricular end diastolic pressure was 5.    The estimated blood loss was none.    There was non-obstructive coronary artery disease..    Mild non-obstructive CAD  Normal right and left sided filling pressures with severe pulmonary HTN; RA 4mmHg, PA 73/28/43mmHg and LVEDP 5mmHg  Normal systemic flow estimation CO/CI; 4.9/5.6 by Jacob and 4.9/2.5 by TD    Plan:  Workup underlying pulmonary HTN    The procedure log was documented by Documenter: Cruzito Schaeffer and verified by Jason Dean MD.    Date: 5/15/2024  Time: 5:28 PM      and Echocardiogram: Transthoracic echo (TTE) complete (Cupid Only):   Results for orders placed or performed during the hospital encounter of 05/13/24   Echo   Result Value Ref Range    BSA 1.94 m2    LVOT stroke volume 35.91 cm3    LVIDd 2.77 (A) 3.5 - 6.0 cm    LV Systolic Volume 10.79 mL    LV Systolic Volume Index 5.5 mL/m2    LVIDs 1.87 (A) 2.1 - 4.0 cm    LV Diastolic Volume " 28.74 mL    LV Diastolic Volume Index 14.74 mL/m2    IVS 1.61 (A) 0.6 - 1.1 cm    LVOT diameter 2.03 cm    LVOT area 3.2 cm2    FS 32 28 - 44 %    Left Ventricle Relative Wall Thickness 1.06 cm    Posterior Wall 1.47 (A) 0.6 - 1.1 cm    LV mass 149.48 g    LV Mass Index 77 g/m2    MV Peak E Juice 0.41 m/s    TDI LATERAL 0.07 m/s    TDI SEPTAL 0.04 m/s    E/E' ratio 7.45 m/s    MV Peak A Juice 0.69 m/s    TR Max Juice 3.65 m/s    E/A ratio 0.59     E wave deceleration time 203.56 msec    LV SEPTAL E/E' RATIO 10.25 m/s    LV LATERAL E/E' RATIO 5.86 m/s    LVOT peak juice 0.70 m/s    Left Ventricular Outflow Tract Mean Velocity 0.49 cm/s    Left Ventricular Outflow Tract Mean Gradient 1.10 mmHg    Right ventricular length in diastole (apical 4-chamber view) 7.41 cm    RV mid diameter 4.57 cm    TAPSE 2.17 cm    LA volume (mod) 39.21 cm3    LA Volume Index (Mod) 20.1 mL/m2    RA area 35.7 cm2    Right Atrium Volume Systolic 175.50 mL    AV mean gradient 2 mmHg    AV peak gradient 3 mmHg    Ao peak juice 0.89 m/s    Ao VTI 13.10 cm    LVOT peak VTI 11.10 cm    AV valve area 2.74 cm²    AV Velocity Ratio 0.79     AV index (prosthetic) 0.85     NAYELI by Velocity Ratio 2.54 cm²    MV stenosis pressure 1/2 time 59.03 ms    MV valve area p 1/2 method 3.73 cm2    Triscuspid Valve Regurgitation Peak Gradient 53 mmHg    PV PEAK VELOCITY 0.64 m/s    PV peak gradient 2 mmHg    Ao root annulus 2.30 cm    IVC diameter 2.62 cm    Mean e' 0.06 m/s    ZLVIDS -4.77     ZLVIDD -6.97     AORTIC VALVE CUSP SEPERATION 2.17 cm    RVDD 5.79 cm    RV-rose mid d 4.6 cm    RV-rose length 7.4 cm    RV/LV Ratio 2.09 cm    EF 40 %    TV resting pulmonary artery pressure 56 mmHg    RV TB RVSP 7 mmHg    Est. RA pres 3 mmHg    Narrative      Left Ventricle: The left ventricle is normal in size. Mildly increased   wall thickness. There is concentric remodeling. Unable to assess wall   motion. There is moderately reduced systolic function. Ejection fraction   by  visual approximation is 40%.    Right Ventricle: Severe right ventricular enlargement. Systolic   function is severely reduced.    Left Atrium: Left atrium is mildly dilated.    Right Atrium: Right atrium is severely dilated.    Aortic Valve: The aortic valve is a trileaflet valve.    Tricuspid Valve: There is moderate to severe regurgitation with a   centrally directed jet.    Pulmonic Valve: There is mild regurgitation.    Pulmonary Artery: The estimated pulmonary artery systolic pressure is   56 mmHg.    IVC/SVC: Normal venous pressure at 3 mmHg.

## 2024-05-16 NOTE — ASSESSMENT & PLAN NOTE
5/16/2024:  - On home digoxin  - Not on anticoagulation, apparently [facility]  - EKG SR, tele ordered but according to nurse pt tore his wires up and threw monitor in the trash, refused to wear it

## 2024-05-16 NOTE — ASSESSMENT & PLAN NOTE
5/16/2024:  - RHC: RA 4mmHg, PA 73/28/43mmHg and LVEDP 5mmHg   - To follow up with pulmonology outpatient

## 2024-05-16 NOTE — ASSESSMENT & PLAN NOTE
Patient with acute kidney injury/acute renal failure likely due to pre-renal azotemia due to IVVD  Baseline creatinine  0.9  - Labs reviewed- Renal function/electrolytes with Estimated Creatinine Clearance: 64.8 mL/min (based on SCr of 1.21 mg/dL). according to latest data.   Improving renal function.   Monitor urine output and serial BMP and adjust therapy as needed. Avoid nephrotoxins and renally dose meds for GFR listed above.

## 2024-05-16 NOTE — PT/OT/SLP EVAL
Occupational Therapy   Evaluation    Name: Steven Crouch  MRN: [id number]  Admitting Diagnosis: Syncopal episodes  Recent Surgery: Procedure(s) (LRB):  CATHETERIZATION, HEART, BOTH LEFT AND RIGHT (N/A) 1 Day Post-Op    Recommendations:     Discharge Recommendations: Moderate Intensity Therapy (to low intensity rehab)  Discharge Equipment Recommendations:  to be determined by next level of care  Barriers to discharge:  Decreased caregiver support    Assessment:     Steven Crouch is a 64 y.o. male with a medical diagnosis of Syncopal episodes.  He presents with weakness and decline in ADLs. Performance deficits affecting function: weakness, impaired endurance, impaired functional mobility, impaired self care skills, gait instability, impaired balance.      Rehab Prognosis: Good; patient would benefit from acute skilled OT services to address these deficits and reach maximum level of function.       Plan:     Patient to be seen 5 x/week to address the above listed problems via self-care/home management, therapeutic activities, therapeutic exercises  Plan of Care Expires: 06/13/24  Plan of Care Reviewed with: patient    Subjective     Chief Complaint: syncopal episodes   Patient/Family Comments/goals: pt agreeable to OT eval    Occupational Profile:  Living Environment: pt lives alone in one story home with 3 steps to enter with handrails  Previous level of function: independent with ADL tasks and utilized cane for functional mobility   Roles and Routines: perform self care  Equipment Used at Home: cane, straight, shower chair  Assistance upon Discharge: home with home health versus swing bed    Pain/Comfort:  Pain Rating 1: 5/10  Location 1: chest  Pain Addressed 1: Nurse notified    Patients cultural, spiritual, [employer]ism conflicts given the current situation: no    Objective:     Communicated with: JUAN Palmer prior to session.  Patient found supine with peripheral IV, telemetry upon OT entry to room.    General  Precautions: Standard, fall  Orthopedic Precautions: N/A  Braces: N/A  Respiratory Status: Nasal cannula, flow 2 L/min    Occupational Performance:    Bed Mobility:    Patient completed Supine to Sit with modified independence  Patient completed Sit to Supine with modified independence    Functional Mobility/Transfers:  Patient completed Sit <> Stand Transfer with stand by assistance  with  no assistive device   Functional Mobility: pt performed functional mobility to door and back to bed with SBA with no AD    Activities of Daily Living:  Upper Body Dressing: supervision to satnam gown as robe  Lower Body Dressing: maximal assistance to satnam socks    Cognitive/Visual Perceptual:  Cognitive/Psychosocial Skills:     -       Oriented to: Person and Place   -       Follows Commands/attention:Follows two-step commands  -       Mood/Affect/Coping skills/emotional control: Cooperative    Physical Exam:  Balance:    -       sitting balance WFL; standing balance fair  Upper Extremity Range of Motion:     -       Right Upper Extremity: WFL  -       Left Upper Extremity: WFL  Upper Extremity Strength:    -       Right Upper Extremity: 4/5  -       Left Upper Extremity: 4/5  Gross motor coordination:   WFL    AMPAC 6 Click ADL:  AMPAC Total Score: 18    Treatment & Education:  Pt educated on OT role/POC.   Importance of OOB activity with staff assistance.  Importance of sitting up in the chair throughout the day as tolerated, especially for meals   Safety during functional t/f and mobility with use of RW  Importance of assisting with self-care activities   All questions/concerns answered within OT scope of practice      Patient left supine with all lines intact, call button in reach, bed alarm on, and JUAN Palmer notified    GOALS:   Multidisciplinary Problems       Occupational Therapy Goals          Problem: Occupational Therapy    Goal Priority Disciplines Outcome Interventions   Occupational Therapy Goal     OT, PT/OT  Progressing    Description: STG:  Pt will perform grooming with setup  Pt will bathe with Fanny with setup at EOB  Pt will perform UE dressing with Jefferson  Pt will perform LE dressing with Fanny  Pt will sit EOB x 10 min with SBA   Pt will transfer bed/chair/bsc with CGA with RW  Pt will perform standing task x 2 min with CGA with RW   Pt will tolerate 15 minutes of tx without fatigue      LT.Restore to max I with self care and mobility.                           History:     Past Medical History:   Diagnosis Date    A-fib     Hypertension     Unspecified cirrhosis of liver        History reviewed. No pertinent surgical history.    Time Tracking:     OT Date of Treatment: 24  OT Start Time: 911  OT Stop Time: 930  OT Total Time (min): 19 min    Billable Minutes:Evaluation OT min complexity eval    2024

## 2024-05-16 NOTE — ASSESSMENT & PLAN NOTE
Patient was found to have elevated troponin 403--->1,846 --> 4,287 without any ischemic abnormalities seen on EKG.  Patient reports exertional dyspnea which will be angina equivalent.   CTA chest negative for PE.   [facility] with non-OBS CAD.  Unclear etiology for troponin leak.   Continue aspirin and statin.   Monitor on tele.

## 2024-05-16 NOTE — PLAN OF CARE
Problem: Physical Therapy  Goal: Physical Therapy Goal  Description: Short Term Goals to be met by: 2024    Patient will increase functional independence with mobility by performin. Supine to sit with independently  2. Sit to stand transfer with independently using no assistive device  3. Bed to chair transfer with independently using no assistive device  4. Gait  x 100 feet with independently using no assistive device  5. Lower extremity exercise program x30 reps per handout, with assistance as needed    Long Term Goals to be met by: 2024    Pt will regain full independent functional mobility with lowest level of assistive device to return to home situation and prior activities of daily living.   Outcome: Progressing     Patient mildly confused but able to follow commands for PT interventions. Patient demonstrates decreased functional mobility from reported baseline and will benefit from skilled PT ot safely progress activities. D/c disposition of home with HHPT vs swing bed to be determined by rate of recovery.

## 2024-05-17 VITALS
DIASTOLIC BLOOD PRESSURE: 84 MMHG | OXYGEN SATURATION: 96 % | SYSTOLIC BLOOD PRESSURE: 135 MMHG | HEART RATE: 78 BPM | HEIGHT: 71 IN | WEIGHT: 166 LBS | TEMPERATURE: 98 F | RESPIRATION RATE: 18 BRPM | BODY MASS INDEX: 23.24 KG/M2

## 2024-05-17 LAB
ALBUMIN SERPL BCP-MCNC: 2.7 G/DL (ref 3.5–5)
ALBUMIN/GLOB SERPL: 0.7 {RATIO}
ALP SERPL-CCNC: 102 U/L (ref 45–115)
ALT SERPL W P-5'-P-CCNC: 101 U/L (ref 16–61)
ANION GAP SERPL CALCULATED.3IONS-SCNC: 8 MMOL/L (ref 7–16)
AST SERPL W P-5'-P-CCNC: 113 U/L (ref 15–37)
BASOPHILS # BLD AUTO: 0.05 K/UL (ref 0–0.2)
BASOPHILS NFR BLD AUTO: 1 % (ref 0–1)
BILIRUB SERPL-MCNC: 1.3 MG/DL (ref ?–1.2)
BUN SERPL-MCNC: 16 MG/DL (ref 7–18)
BUN/CREAT SERPL: 15 (ref 6–20)
CALCIUM SERPL-MCNC: 8.6 MG/DL (ref 8.5–10.1)
CHLORIDE SERPL-SCNC: 110 MMOL/L (ref 98–107)
CO2 SERPL-SCNC: 24 MMOL/L (ref 21–32)
CREAT SERPL-MCNC: 1.05 MG/DL (ref 0.7–1.3)
DIFFERENTIAL METHOD BLD: ABNORMAL
EGFR (NO RACE VARIABLE) (RUSH/TITUS): 79 ML/MIN/1.73M2
EOSINOPHIL # BLD AUTO: 0.16 K/UL (ref 0–0.5)
EOSINOPHIL NFR BLD AUTO: 3.4 % (ref 1–4)
ERYTHROCYTE [DISTWIDTH] IN BLOOD BY AUTOMATED COUNT: 17.2 % (ref 11.5–14.5)
GLOBULIN SER-MCNC: 4.1 G/DL (ref 2–4)
GLUCOSE SERPL-MCNC: 86 MG/DL (ref 74–106)
HCT VFR BLD AUTO: 34.9 % (ref 40–54)
HGB BLD-MCNC: 11.2 G/DL (ref 13.5–18)
IMM GRANULOCYTES # BLD AUTO: 0.01 K/UL (ref 0–0.04)
IMM GRANULOCYTES NFR BLD: 0.2 % (ref 0–0.4)
LYMPHOCYTES # BLD AUTO: 1.13 K/UL (ref 1–4.8)
LYMPHOCYTES NFR BLD AUTO: 23.7 % (ref 27–41)
MCH RBC QN AUTO: 28.6 PG (ref 27–31)
MCHC RBC AUTO-ENTMCNC: 32.1 G/DL (ref 32–36)
MCV RBC AUTO: 89.3 FL (ref 80–96)
MONOCYTES # BLD AUTO: 0.88 K/UL (ref 0–0.8)
MONOCYTES NFR BLD AUTO: 18.4 % (ref 2–6)
MPC BLD CALC-MCNC: 11.8 FL (ref 9.4–12.4)
NEUTROPHILS # BLD AUTO: 2.54 K/UL (ref 1.8–7.7)
NEUTROPHILS NFR BLD AUTO: 53.3 % (ref 53–65)
NRBC # BLD AUTO: 0 X10E3/UL
NRBC, AUTO (.00): 0 %
PLATELET # BLD AUTO: 96 K/UL (ref 150–400)
POTASSIUM SERPL-SCNC: 4.2 MMOL/L (ref 3.5–5.1)
PROT SERPL-MCNC: 6.8 G/DL (ref 6.4–8.2)
RBC # BLD AUTO: 3.91 M/UL (ref 4.6–6.2)
SODIUM SERPL-SCNC: 138 MMOL/L (ref 136–145)
WBC # BLD AUTO: 4.77 K/UL (ref 4.5–11)

## 2024-05-17 PROCEDURE — 25000242 PHARM REV CODE 250 ALT 637 W/ HCPCS: Performed by: INTERNAL MEDICINE

## 2024-05-17 PROCEDURE — 85025 COMPLETE CBC W/AUTO DIFF WBC: CPT | Performed by: INTERNAL MEDICINE

## 2024-05-17 PROCEDURE — 30200315 PPD INTRADERMAL TEST REV CODE 302: Performed by: INTERNAL MEDICINE

## 2024-05-17 PROCEDURE — 97110 THERAPEUTIC EXERCISES: CPT | Mod: CO

## 2024-05-17 PROCEDURE — 99900035 HC TECH TIME PER 15 MIN (STAT)

## 2024-05-17 PROCEDURE — 63600175 PHARM REV CODE 636 W HCPCS: Performed by: INTERNAL MEDICINE

## 2024-05-17 PROCEDURE — 25000003 PHARM REV CODE 250: Performed by: INTERNAL MEDICINE

## 2024-05-17 PROCEDURE — 36415 COLL VENOUS BLD VENIPUNCTURE: CPT | Performed by: INTERNAL MEDICINE

## 2024-05-17 PROCEDURE — 99239 HOSP IP/OBS DSCHRG MGMT >30: CPT | Mod: ,,, | Performed by: INTERNAL MEDICINE

## 2024-05-17 PROCEDURE — 97116 GAIT TRAINING THERAPY: CPT | Mod: CQ

## 2024-05-17 PROCEDURE — 80053 COMPREHEN METABOLIC PANEL: CPT | Performed by: INTERNAL MEDICINE

## 2024-05-17 PROCEDURE — 27000221 HC OXYGEN, UP TO 24 HOURS

## 2024-05-17 PROCEDURE — 86580 TB INTRADERMAL TEST: CPT | Performed by: INTERNAL MEDICINE

## 2024-05-17 PROCEDURE — 94640 AIRWAY INHALATION TREATMENT: CPT

## 2024-05-17 PROCEDURE — 94761 N-INVAS EAR/PLS OXIMETRY MLT: CPT

## 2024-05-17 PROCEDURE — 25000003 PHARM REV CODE 250: Performed by: NURSE PRACTITIONER

## 2024-05-17 PROCEDURE — 97110 THERAPEUTIC EXERCISES: CPT | Mod: CQ

## 2024-05-17 RX ORDER — METOPROLOL SUCCINATE 25 MG/1
25 TABLET, EXTENDED RELEASE ORAL DAILY
Start: 2024-05-18 | End: 2025-05-18

## 2024-05-17 RX ORDER — ATORVASTATIN CALCIUM 80 MG/1
80 TABLET, FILM COATED ORAL NIGHTLY
Start: 2024-05-17 | End: 2025-05-17

## 2024-05-17 RX ORDER — ASPIRIN 81 MG/1
81 TABLET ORAL DAILY
Start: 2024-05-18 | End: 2025-05-18

## 2024-05-17 RX ORDER — LOSARTAN POTASSIUM 25 MG/1
25 TABLET ORAL DAILY
Start: 2024-05-17 | End: 2025-05-17

## 2024-05-17 RX ADMIN — SODIUM CHLORIDE: 9 INJECTION, SOLUTION INTRAVENOUS at 09:05

## 2024-05-17 RX ADMIN — DIGOXIN 125 MCG: 125 TABLET ORAL at 09:05

## 2024-05-17 RX ADMIN — LEVALBUTEROL HYDROCHLORIDE 1.25 MG: 1.25 SOLUTION RESPIRATORY (INHALATION) at 07:05

## 2024-05-17 RX ADMIN — IPRATROPIUM BROMIDE 0.5 MG: 0.5 SOLUTION RESPIRATORY (INHALATION) at 12:05

## 2024-05-17 RX ADMIN — METOPROLOL SUCCINATE 25 MG: 25 TABLET, EXTENDED RELEASE ORAL at 09:05

## 2024-05-17 RX ADMIN — TUBERCULIN PURIFIED PROTEIN DERIVATIVE 5 UNITS: 5 INJECTION, SOLUTION INTRADERMAL at 11:05

## 2024-05-17 RX ADMIN — LEVALBUTEROL HYDROCHLORIDE 1.25 MG: 1.25 SOLUTION RESPIRATORY (INHALATION) at 12:05

## 2024-05-17 RX ADMIN — THIAMINE HYDROCHLORIDE 250 MG: 100 INJECTION, SOLUTION INTRAMUSCULAR; INTRAVENOUS at 09:05

## 2024-05-17 RX ADMIN — IPRATROPIUM BROMIDE 0.5 MG: 0.5 SOLUTION RESPIRATORY (INHALATION) at 07:05

## 2024-05-17 RX ADMIN — ASPIRIN 81 MG: 81 TABLET, COATED ORAL at 09:05

## 2024-05-17 RX ADMIN — OXYCODONE 5 MG: 5 TABLET ORAL at 09:05

## 2024-05-17 NOTE — ASSESSMENT & PLAN NOTE
Rate is adequately controlled on diltiazem and digoxin, but patient is not on any anticoagulation with CHADS-VASc score of 3 and has bled score of 3.  Reportedly, [facility]  Resume digoxin, DC cardizem, will be on Toprol as well.  Not on AC at home, to discuss with cardiology as outpatient.   Monitor on tele.

## 2024-05-17 NOTE — ASSESSMENT & PLAN NOTE
Chronic, controlled. Latest blood pressure and vitals reviewed-     Temp:  [97.6 °F (36.4 °C)-98.9 °F (37.2 °C)]   Pulse:  [70-80]   Resp:  [16-20]   BP: (102-135)/(65-90)   SpO2:  [94 %-97 %] .   Home meds for hypertension were reviewed and noted below.   Hypertension Medications               diltiaZEM (CARDIZEM CD) 180 MG 24 hr capsule Take 180 mg by mouth once daily.          Home meds changed to GDMT.     Will utilize p.r.n. blood pressure medication only if patient's blood pressure greater than 180/110 and he develops symptoms such as worsening chest pain or shortness of breath.

## 2024-05-17 NOTE — ASSESSMENT & PLAN NOTE
It appeared that patient was once diagnosed with cirrhosis of liver associated with alcoholism and hepatitis-C.    Patient continues to drink.   Total bilirubin level and transaminases levels were almost within normal range, PT/INR 1.5.  Outpatient GI follow up (referral sent).

## 2024-05-17 NOTE — PT/OT/SLP PROGRESS
Physical Therapy Treatment    Patient Name:  Steven Crouch   MRN:  [id number]    Recommendations:     Discharge Recommendations: Low Intensity Therapy (to moderate intensity)  Discharge Equipment Recommendations: to be determined by next level of care  Barriers to discharge:  ongoing medical treatment    Assessment:     Steven Crouch is a 64 y.o. male admitted with a medical diagnosis of Syncopal episodes.  He presents with the following impairments/functional limitations: weakness, impaired endurance, impaired self care skills, impaired functional mobility, impaired cardiopulmonary response to activity.    Pt participated well with PT, demo increased gait distance as compared to eval    PT POC discussed with Teri Glover DPT     Rehab Prognosis: Good and Fair; patient would benefit from acute skilled PT services to address these deficits and reach maximum level of function.    Recent Surgery: Procedure(s) (LRB):  CATHETERIZATION, HEART, BOTH LEFT AND RIGHT (N/A) 2 Days Post-Op    Plan:     During this hospitalization, patient to be seen 5 x/week to address the identified rehab impairments via gait training, therapeutic activities, therapeutic exercises and progress toward the following goals:    Plan of Care Expires:  06/16/24    Subjective     Chief Complaint: syncopal episode  Patient/Family Comments/goals: pt agreeable  Pain/Comfort:         Objective:     Communicated with Spencer Ahuja LPN prior to session.  Patient found HOB elevated with peripheral IV, oxygen upon PT entry to room.     General Precautions: Standard, fall  Orthopedic Precautions: N/A  Braces: N/A  Respiratory Status: Nasal cannula, flow 2 L/min     Functional Mobility:  Bed Mobility:     Supine to Sit: contact guard assistance  Transfers:     Sit to Stand:  stand by assistance with no AD  Gait: 84' x 2 trials with O2 @ 2L/min, pushing IV pole and standby to contact guard assist; slow ela, mildly increased SOB facilitating a seated rest  break,       AM-PAC 6 CLICK MOBILITY  Turning over in bed (including adjusting bedclothes, sheets and blankets)?: 4  Sitting down on and standing up from a chair with arms (e.g., wheelchair, bedside commode, etc.): 4  Moving from lying on back to sitting on the side of the bed?: 4  Moving to and from a bed to a chair (including a wheelchair)?: 3  Need to walk in hospital room?: 3  Climbing 3-5 steps with a railing?: 2  Basic Mobility Total Score: 20       Treatment & Education:  Pt performed 2 x 15 reps (B) LE exercises: ap, quad set, glut set, straight leg raise, hip ab/adduction, long arc quad, heel slide with active assist     Patient left up in chair with all lines intact and call button in reach..    GOALS:   Multidisciplinary Problems       Physical Therapy Goals          Problem: Physical Therapy    Goal Priority Disciplines Outcome Goal Variances Interventions   Physical Therapy Goal     PT, PT/OT Progressing     Description: Short Term Goals to be met by: 2024    Patient will increase functional independence with mobility by performin. Supine to sit with independently  2. Sit to stand transfer with independently using no assistive device  3. Bed to chair transfer with independently using no assistive device  4. Gait  x 100 feet with independently using no assistive device  5. Lower extremity exercise program x30 reps per handout, with assistance as needed    Long Term Goals to be met by: 2024    Pt will regain full independent functional mobility with lowest level of assistive device to return to home situation and prior activities of daily living.                        Time Tracking:     PT Received On: 24  PT Start Time: 1120     PT Stop Time: 1148  PT Total Time (min): 28 min     Billable Minutes: Gait Training 12 and Therapeutic Exercise 15    Treatment Type: Treatment  PT/PTA: PTA     Number of PTA visits since last PT visit: 1     2024

## 2024-05-17 NOTE — ASSESSMENT & PLAN NOTE
Patient was found to have elevated troponin 403--->1,846 --> 4,287 without any ischemic abnormalities seen on EKG.  Patient reports exertional dyspnea which will be angina equivalent.   CTA chest negative for PE.   [facility] with non-OBS CAD.  Troponin leak related to RV overload which can lead to infarct.    Continue aspirin and statin.   Monitor on tele.

## 2024-05-17 NOTE — PLAN OF CARE
[facility] Telemetry  Discharge Final Note    Primary Care Provider: Kymberly Primary Doctor    Expected Discharge Date: 5/17/2024    Final Discharge Note (most recent)       Final Note - 05/17/24 1357          Final Note    Assessment Type Final Discharge Note     Anticipated Discharge Disposition Skilled Nursing Facility     What phone number can be called within the next 1-3 days to see how you are doing after discharge? [phone]        Post-Acute Status    Post-Acute Authorization Placement     Post-Acute Placement Status Set-up Complete/Auth obtained     Coverage Medicare, [facility]     Patient choice form signed by patient/caregiver List with quality metrics by geographic area provided;List from CMS Compare;List from System Post-Acute Care     Discharge Delays None known at this time                     Important Message from Medicare  Important Message from Medicare regarding Discharge Appeal Rights: Given to patient/caregiver, Explained to patient/caregiver, Signed/date by patient/caregiver     Date IMM was signed: 05/17/24  Time IMM was signed: 1100     Follow-up providers       Sridevi Boateng ACNP   Specialty: Cardiology    [address] [facility] [place] [address]   Phone: [phone]       Next Steps: Follow up on 6/6/2024    Instructions: Appointment scheduled on 6/6/2024 at 1:40pm    Camryn Perez MD   Specialty: Pulmonary Disease    [address] [place] [address]   Phone: [phone]       Next Steps: Schedule an appointment as soon as possible for a visit in 4 week(s)    Dee Sosa FNP   Specialty: Gastroenterology, Emergency Medicine    [address] [place] [address]   Phone: [phone]       Next Steps: Schedule an appointment as soon as possible for a visit in 4 week(s)    Instructions: Cirrhosis              After-discharge care                Destination       [facility]   Service: Skilled Nursing    [address]  [place] [address]   Phone: [phone]                             Pt to dc to [facility]. IM updated. No other needs at this time.

## 2024-05-17 NOTE — PLAN OF CARE
SS spoke with MD. MD recommended swb placement for pt when medically ready for dc. SS spoke with pt and obtained choice for [facility]. SS faxed referral and notified Mi of referral. SS following    1040: [facility] unable to accept pt due to having [facility] benefits secondary. SS faxed referral to 2nd choice-[facility]. SS attempted to notify Kristin of referral and left vm. SS following    1200: SS spoke with Kristin at [facility]. Pt is accepted for today with a 3pm cut-off time. Kristin says to call her if pt is unable to be in facility prior to 3pm. SS notified pt and pt's friends (Lee and Amanda). Lee and Amanda state they will transport pt to [facility] and are aware of 3pm cut-off time. SS completed packet including TB signs & symptoms and TB skin test. SS faxed chest x-ray. SS delivered packet to [facility]. SS following

## 2024-05-17 NOTE — ASSESSMENT & PLAN NOTE
Concern for pulmonary HTN and RV overload per echo.  ABG showed pO2 59, normal pH.   Requiring 3L NC since admission.  Management as above.   Recommend home O2 eval prior to discharge from swing bed.

## 2024-05-17 NOTE — PLAN OF CARE
Problem: Adult Inpatient Plan of Care  Goal: Absence of Hospital-Acquired Illness or Injury  Outcome: Progressing

## 2024-05-17 NOTE — DISCHARGE SUMMARY
[facility] Medicine  Discharge Summary      Patient Name: Steven Crouch  MRN: [id number]  [facility]: [id number]  Patient Class: IP- Inpatient  Admission Date: 5/13/2024  Hospital Length of Stay: 3 days  Discharge Date and Time:  05/17/2024 12:23 PM  Attending Physician: Ramesh Guthrie MD   Discharging Provider: RAMESH GUTHRIE MD  Primary Care Provider: Kymberly, Primary Doctor    Primary Care Team: Networked reference to record PCT     HPI:   Patient is a 64-year-old male with a history of a flutter on rate control but not on any anticoagulation in the setting of chronic grade 1 diastolic dysfunction, essential hypertension, COPD, cirrhosis of liver associated with alcoholism and hepatitis-C who was brought in by EMS after having been found poorly responsive sitting on a porch by his friend.  Last time known normal was approximately 30 minutes.  Patient did not appeared to have had any fecal or urinary incontinence.  Patient also did not appear to have any focal neurological deficits.  When EMS arrived patient exhibit some degree of dysarthria but this shortly was resolved.  Patient however was found to be hypotensive with systolic blood pressure in the 80s and blood pressure normalized after having had received IV fluids EN route to the hospital.    On presentation, vital signs were stable and patient was afebrile.  Workup was notable for presence of elevated proBNP of 7563, and elevated troponin level 403 ---> 1,846 without any ischemic abnormalities seen on EKG.  The rest of the workup in ED was otherwise unremarkable.  Patient will be admitted for further evaluation and intervention    Procedure(s) (LRB):  CATHETERIZATION, HEART, BOTH LEFT AND RIGHT (N/A)      Hospital Course:   5/14- Seen patient with Dr. Denny from cardiology, concern for significant RV overload and PE- ordered CTA chest, if negative then patient will proceed with left heart catheterization. Continue  heparin infusion.   5/15- CTA negative for PE. Unable to do heart cath yesterday due to extreme anxiety. Started on benzos per CIWA protocol. S/p LHC which showed non-OBS CAD. S/p RHC which showed severe pulm HTN.   5/16- PT/OT consulted, dispo planning in progress.   5/17- Accepted at [facility] for swing bed, discharge today. Patient to follow up with cardiology and pulmonology (Dr. Perez) as outpatient.      Goals of Care Treatment Preferences:  Code Status: Full Code      Consults:   Consults (From admission, onward)          Status Ordering Provider     Inpatient consult to Cardiology  Once        Provider:  Harrison Denny MD    Completed KAREN CARVAJAL S            Psychiatric  Alcohol dependence with withdrawal with perceptual disturbance  On CIWA protocol, thiamine, Ativan.   Monitor for DT's.       Pulmonary  COPD (chronic obstructive pulmonary disease)  Continues to smoke.   Patient is intolerant of albuterol which causes heart palpitation.    Started patient on Xopenex and Atrovent neb treatments    Acute respiratory failure with hypoxia  Concern for pulmonary HTN and RV overload per echo.  ABG showed pO2 59, normal pH.   Requiring 3L NC since admission.  Management as above.   Recommend home O2 eval prior to discharge from swing bed.     Cardiac/Vascular  NICM (nonischemic cardiomyopathy)  As above.       Paroxysmal atrial fibrillation  Rate is adequately controlled on diltiazem and digoxin, but patient is not on any anticoagulation with CHADS-VASc score of 3 and has bled score of 3.  Reportedly, [facility]  Resume digoxin, DC cardizem, will be on Toprol as well.  Not on AC at home, to discuss with cardiology as outpatient.   Monitor on tele.    Essential hypertension    Chronic, controlled. Latest blood pressure and vitals reviewed-     Temp:  [97.6 °F (36.4 °C)-98.9 °F (37.2 °C)]   Pulse:  [70-80]   Resp:  [16-20]   BP: (102-135)/(65-90)   SpO2:  [94 %-97 %] .    Home meds for hypertension were reviewed and noted below.   Hypertension Medications               diltiaZEM (CARDIZEM CD) 180 MG 24 hr capsule Take 180 mg by mouth once daily.          Home meds changed to GDMT.     Will utilize p.r.n. blood pressure medication only if patient's blood pressure greater than 180/110 and he develops symptoms such as worsening chest pain or shortness of breath.      Chronic combined systolic and diastolic heart failure  Echo    Result Date: 5/14/2024    Left Ventricle: The left ventricle is normal in size. Mildly increased   wall thickness. There is concentric remodeling. Unable to assess wall   motion. There is moderately reduced systolic function. Ejection fraction   by visual approximation is 40%.    Right Ventricle: Severe right ventricular enlargement. Systolic   function is severely reduced.    Left Atrium: Left atrium is mildly dilated.    Right Atrium: Right atrium is severely dilated.    Aortic Valve: The aortic valve is a trileaflet valve.    Tricuspid Valve: There is moderate to severe regurgitation with a   centrally directed jet.    Pulmonic Valve: There is mild regurgitation.    Pulmonary Artery: The estimated pulmonary artery systolic pressure is   56 mmHg.    IVC/SVC: Normal venous pressure at 3 mmHg.         Combination of mostly right heart failure and some left sided failure/cardiomyopathy.   Clinically appears euvolemic.   Patient has grade 1 diastolic heart failure and is reportedly on water pill on a daily basis but patient's medication list does not reflect the patient is taking Lasix.  Cardiology recommends addition of Toprol and losartan- low dose.  No need to add diuretics at this point unless patient shows signs of volume overload.   Outpatient cardiology follow up.       Severe pulmonary hypertension  Echo    Result Date: 5/14/2024    Left Ventricle: The left ventricle is normal in size. Mildly increased   wall thickness. There is concentric remodeling.  Unable to assess wall   motion. There is moderately reduced systolic function. Ejection fraction   by visual approximation is 40%.    Right Ventricle: Severe right ventricular enlargement. Systolic   function is severely reduced.    Left Atrium: Left atrium is mildly dilated.    Right Atrium: Right atrium is severely dilated.    Aortic Valve: The aortic valve is a trileaflet valve.    Tricuspid Valve: There is moderate to severe regurgitation with a   centrally directed jet.    Pulmonic Valve: There is mild regurgitation.    Pulmonary Artery: The estimated pulmonary artery systolic pressure is   56 mmHg.    IVC/SVC: Normal venous pressure at 3 mmHg.       S/p RHC: Normal right and left sided filling pressures with severe pulmonary HTN; RA 4mmHg, PA 73/28/43mmHg   Outpatient follow up with Dr. Perez (referral sent), referral to Pulmonary HTN clinic eventually.       Type 2 myocardial infarction  Patient was found to have elevated troponin 403--->1,846 --> 4,287 without any ischemic abnormalities seen on EKG.  Patient reports exertional dyspnea which will be angina equivalent.   CTA chest negative for PE.   LHC with non-OBS CAD.  Troponin leak related to RV overload which can lead to infarct.    Continue aspirin and statin.   Monitor on tele.       Renal/  LEIDY (acute kidney injury)  Patient with acute kidney injury/acute renal failure likely due to pre-renal azotemia due to IVVD  Baseline creatinine  0.9  - Labs reviewed- Renal function/electrolytes with Estimated Creatinine Clearance: 74.7 mL/min (based on SCr of 1.05 mg/dL). according to latest data.   Resolved.       GI  Elevated LFTs  Sec to alcohol use disorder.  Trend LFT's.       Cirrhosis of liver  It appeared that patient was once diagnosed with cirrhosis of liver associated with alcoholism and hepatitis-C.    Patient continues to drink.   Total bilirubin level and transaminases levels were almost within normal range, PT/INR 1.5.  Outpatient GI follow up  (referral sent).     Other  * Syncopal episodes  Could be sec to RV infarct leading to hypotension, bradycardia.   Management/workup as below.         Final Active Diagnoses:    Diagnosis Date Noted POA    PRINCIPAL PROBLEM:  Syncopal episodes [R55] 05/14/2024 Yes    Type 2 myocardial infarction [I21.A1] 05/14/2024 Yes    Acute respiratory failure with hypoxia [J96.01] 05/14/2024 Yes    Severe pulmonary hypertension [I27.20] 05/15/2024 Yes    Chronic combined systolic and diastolic heart failure [I50.42] 05/14/2024 Yes    Alcohol dependence with withdrawal with perceptual disturbance [F10.232] 05/15/2024 No    LEIDY (acute kidney injury) [N17.9] 05/14/2024 Yes    Elevated LFTs [R79.89] 05/16/2024 Yes    NICM (nonischemic cardiomyopathy) [I42.8] 05/16/2024 No    Paroxysmal atrial fibrillation [I48.0] 05/15/2024 Yes    Essential hypertension [I10] 05/14/2024 Yes    COPD (chronic obstructive pulmonary disease) [J44.9] 05/14/2024 Yes    Cirrhosis of liver [K74.60] 05/14/2024 Yes      Problems Resolved During this Admission:       Discharged Condition: fair    Disposition: Rehab Facility    Follow Up:   Contact information for follow-up providers       Sridevi Boateng ACNP Follow up on 6/6/2024.    Specialty: Cardiology  Why: Appointment scheduled on 6/6/2024 at 1:40pm  Contact information:  [address] [facility] [place] [address]  [phone]               Camryn Perez MD. Schedule an appointment as soon as possible for a visit in 4 week(s).    Specialty: Pulmonary Disease  Contact information:  [address] [facility] [address]  [phone]               Dee Sosa FNP. Schedule an appointment as soon as possible for a visit in 4 week(s).    Specialties: Gastroenterology, Emergency Medicine  Why: Cirrhosis  Contact information:  [address] [facility] [address]  [phone]                       Contact information for after-discharge care       Destination       [facility]    Service: Skilled Nursing  Contact information:  [address] [facility] [address]  [phone]                                 Patient Instructions:      Ambulatory referral/consult to Pulmonology   Standing Status: Future   Referral Priority: Routine Referral Type: Consultation   Referral Reason: Specialty Services Required   Referred to Provider: PASTORA MARITZA Requested Specialty: Pulmonary Disease   Number of Visits Requested: 1       Significant Diagnostic Studies: Labs: CMP   Recent Labs   Lab 05/16/24  0439 05/17/24  0521    138   K 4.0 4.2   * 110*   CO2 21 24   GLU 89 86   BUN 22* 16   CREATININE 1.21 1.05   CALCIUM 8.6 8.6   PROT 7.1 6.8   ALBUMIN 2.9* 2.7*   BILITOT 1.6* 1.3*   ALKPHOS 89 102   * 113*   * 101*   ANIONGAP 9 8    and CBC   Recent Labs   Lab 05/17/24 0521   WBC 4.77   HGB 11.2*   HCT 34.9*   PLT 96*       Pending Diagnostic Studies:       Procedure Component Value Units Date/Time    EXTRA TUBES [[id number]] Collected: 05/13/24 1932    Order Status: Sent Lab Status: In process Updated: 05/13/24 1939    Specimen: Blood, Venous     Narrative:      The following orders were created for panel order EXTRA TUBES.  Procedure                               Abnormality         Status                     ---------                               -----------         ------                     Light Blue Top Hold[[id number]]                             In process                 Gold Top Hold[[id number]]                                   In process                   Please view results for these tests on the individual orders.           Medications:  Reconciled Home Medications:      Medication List        START taking these medications      aspirin 81 MG EC tablet  Commonly known as: ECOTRIN  Take 1 tablet (81 mg total) by mouth once daily.  Start taking on: May 18, 2024     atorvastatin 80 MG tablet  Commonly known as: LIPITOR  Take 1 tablet (80 mg  total) by mouth every evening.     losartan 25 MG tablet  Commonly known as: COZAAR  Take 1 tablet (25 mg total) by mouth once daily.     metoprolol succinate 25 MG 24 hr tablet  Commonly known as: TOPROL-XL  Take 1 tablet (25 mg total) by mouth once daily.  Start taking on: May 18, 2024            CONTINUE taking these medications      digoxin 125 mcg tablet  Commonly known as: LANOXIN  Take 125 mcg by mouth once daily.     hydrOXYzine HCL 25 MG tablet  Commonly known as: ATARAX  Take 25 mg by mouth nightly as needed (for anxiety/sedation).     nicotine 14 mg/24 hr  Commonly known as: NICODERM CQ  Place 1 patch onto the skin once daily.     sertraline 100 MG tablet  Commonly known as: ZOLOFT  Take 50 mg by mouth once daily.            STOP taking these medications      diltiaZEM 180 MG 24 hr capsule  Commonly known as: CARDIZEM CD     etodolac 400 MG tablet  Commonly known as: LODINE     furosemide 40 MG tablet  Commonly known as: LASIX     potassium chloride 10 MEQ Tbsr  Commonly known as: KLOR-CON              Indwelling Lines/Drains at time of discharge:   Lines/Drains/Airways       None                   Time spent on the discharge of patient: 42 minutes         PENG STARK MD  Department of Hospital Medicine  [facility]

## 2024-05-17 NOTE — ASSESSMENT & PLAN NOTE
Echo    Result Date: 5/14/2024    Left Ventricle: The left ventricle is normal in size. Mildly increased   wall thickness. There is concentric remodeling. Unable to assess wall   motion. There is moderately reduced systolic function. Ejection fraction   by visual approximation is 40%.    Right Ventricle: Severe right ventricular enlargement. Systolic   function is severely reduced.    Left Atrium: Left atrium is mildly dilated.    Right Atrium: Right atrium is severely dilated.    Aortic Valve: The aortic valve is a trileaflet valve.    Tricuspid Valve: There is moderate to severe regurgitation with a   centrally directed jet.    Pulmonic Valve: There is mild regurgitation.    Pulmonary Artery: The estimated pulmonary artery systolic pressure is   56 mmHg.    IVC/SVC: Normal venous pressure at 3 mmHg.       S/p RHC: Normal right and left sided filling pressures with severe pulmonary HTN; RA 4mmHg, PA 73/28/43mmHg   Outpatient follow up with Dr. Perez (referral sent), referral to Pulmonary HTN clinic eventually.

## 2024-05-17 NOTE — ASSESSMENT & PLAN NOTE
Echo    Result Date: 5/14/2024    Left Ventricle: The left ventricle is normal in size. Mildly increased   wall thickness. There is concentric remodeling. Unable to assess wall   motion. There is moderately reduced systolic function. Ejection fraction   by visual approximation is 40%.    Right Ventricle: Severe right ventricular enlargement. Systolic   function is severely reduced.    Left Atrium: Left atrium is mildly dilated.    Right Atrium: Right atrium is severely dilated.    Aortic Valve: The aortic valve is a trileaflet valve.    Tricuspid Valve: There is moderate to severe regurgitation with a   centrally directed jet.    Pulmonic Valve: There is mild regurgitation.    Pulmonary Artery: The estimated pulmonary artery systolic pressure is   56 mmHg.    IVC/SVC: Normal venous pressure at 3 mmHg.         Combination of mostly right heart failure and some left sided failure/cardiomyopathy.   Clinically appears euvolemic.   Patient has grade 1 diastolic heart failure and is reportedly on water pill on a daily basis but patient's medication list does not reflect the patient is taking Lasix.  Cardiology recommends addition of Toprol and losartan- low dose.  No need to add diuretics at this point unless patient shows signs of volume overload.   Outpatient cardiology follow up.
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
## 2024-05-17 NOTE — PT/OT/SLP PROGRESS
Occupational Therapy   Treatment    Name: Steven Crouch  MRN: [id number]  Admitting Diagnosis:  Syncopal episodes  2 Days Post-Op    Recommendations:     Discharge Recommendations: Moderate Intensity Therapy (to low intensity rehab)  Discharge Equipment Recommendations:  to be determined by next level of care  Barriers to discharge:       Assessment:     Steven Crouch is a 64 y.o. male with a medical diagnosis of Syncopal episodes. Performance deficits affecting function are weakness, impaired endurance, impaired self care skills, impaired cardiopulmonary response to activity.     Rehab Prognosis:  Good; patient would benefit from acute skilled OT services to address these deficits and reach maximum level of function.       Plan:     Patient to be seen 5 x/week to address the above listed problems via self-care/home management, therapeutic activities, therapeutic exercises  Plan of Care Expires: 06/13/24  Plan of Care Reviewed with: patient    Subjective     Chief Complaint:   Patient/Family Comments/goals:   Pain/Comfort:  Pain Rating 1: 0/10    Objective:     Communicated with:Spencer Ahuja RN  prior to session.  Patient found sitting edge of bed with peripheral IV upon OT entry to room.    General Precautions: Standard, fall    Orthopedic Precautions:N/A  Braces: N/A  Respiratory Status: Room air     Occupational Performance:     Bed Mobility:         Functional Mobility/Transfers:    Functional Mobility:     Activities of Daily Living:  I to sit eob       [facility] 6 Click ADL:      Treatment & Education:  Pt performed hand helper with 3 rubber band resistances 20 reps yellow t ball ex's 20 reps,rom ex's with 2 lb wt 15 reps x 2, B elbow flex/ext,abd/add.15 shld flex,red t band 15 reps x 2 B elbow flex/ext,abd/add    Patient left sitting edge of bed with call button in reach    GOALS:   Multidisciplinary Problems       Occupational Therapy Goals          Problem: Occupational Therapy    Goal Priority Disciplines  Outcome Interventions   Occupational Therapy Goal     OT, PT/OT Progressing    Description: STG:  Pt will perform grooming with setup  Pt will bathe with Fanny with setup at EOB  Pt will perform UE dressing with Jefferson  Pt will perform LE dressing with Fanny  Pt will sit EOB x 10 min with SBA   Pt will transfer bed/chair/bsc with CGA with RW  Pt will perform standing task x 2 min with CGA with RW   Pt will tolerate 15 minutes of tx without fatigue      LT.Restore to max I with self care and mobility.                           Time Tracking:     OT Date of Treatment: 24  OT Start Time: 1319  OT Stop Time: 1345  OT Total Time (min): 26 min    Billable Minutes:Therapeutic Exercise 25    OT/AGUEDA: AGUEDA          2024

## 2024-05-21 LAB
OHS QRS DURATION: 128 MS
OHS QRS DURATION: 128 MS
OHS QRS DURATION: 130 MS
OHS QTC CALCULATION: 427 MS
OHS QTC CALCULATION: 442 MS
OHS QTC CALCULATION: 446 MS

## 2024-05-28 NOTE — PHYSICIAN QUERY
Provider,  Due to documentation conflict pleaxse clarify the            Type and Acuity of Heart Failure       ..    Chronic Combined Systolic and Diastolic Heart Failure

## 2024-06-06 ENCOUNTER — OFFICE VISIT (OUTPATIENT)
Dept: CARDIOLOGY | Facility: CLINIC | Age: 64
End: 2024-06-06
Payer: MEDICARE

## 2024-06-06 VITALS
WEIGHT: 172 LBS | HEART RATE: 73 BPM | OXYGEN SATURATION: 95 % | SYSTOLIC BLOOD PRESSURE: 108 MMHG | HEIGHT: 71 IN | DIASTOLIC BLOOD PRESSURE: 78 MMHG | BODY MASS INDEX: 24.08 KG/M2

## 2024-06-06 DIAGNOSIS — I48.0 PAROXYSMAL ATRIAL FIBRILLATION: ICD-10-CM

## 2024-06-06 DIAGNOSIS — I10 ESSENTIAL HYPERTENSION: ICD-10-CM

## 2024-06-06 DIAGNOSIS — F10.232 ALCOHOL DEPENDENCE WITH WITHDRAWAL WITH PERCEPTUAL DISTURBANCE: ICD-10-CM

## 2024-06-06 DIAGNOSIS — I27.20 SEVERE PULMONARY HYPERTENSION: ICD-10-CM

## 2024-06-06 DIAGNOSIS — R55 SYNCOPE, UNSPECIFIED SYNCOPE TYPE: ICD-10-CM

## 2024-06-06 DIAGNOSIS — I48.92 ATRIAL FLUTTER, UNSPECIFIED TYPE: Primary | ICD-10-CM

## 2024-06-06 DIAGNOSIS — I21.A1 TYPE 2 MYOCARDIAL INFARCTION: ICD-10-CM

## 2024-06-06 DIAGNOSIS — I50.22 CHRONIC SYSTOLIC HEART FAILURE: ICD-10-CM

## 2024-06-06 PROBLEM — J96.01 ACUTE RESPIRATORY FAILURE WITH HYPOXIA: Status: RESOLVED | Noted: 2024-05-14 | Resolved: 2024-06-06

## 2024-06-06 PROCEDURE — 99214 OFFICE O/P EST MOD 30 MIN: CPT | Mod: S$PBB,,, | Performed by: NURSE PRACTITIONER

## 2024-06-06 PROCEDURE — 99214 OFFICE O/P EST MOD 30 MIN: CPT | Mod: PBBFAC | Performed by: NURSE PRACTITIONER

## 2024-06-06 RX ORDER — METHOCARBAMOL 500 MG/1
500 TABLET, FILM COATED ORAL 2 TIMES DAILY
COMMUNITY

## 2024-06-06 RX ORDER — LORAZEPAM 0.5 MG/1
0.5 TABLET ORAL NIGHTLY
COMMUNITY

## 2024-06-06 RX ORDER — LANOLIN ALCOHOL/MO/W.PET/CERES
100 CREAM (GRAM) TOPICAL DAILY
COMMUNITY

## 2024-06-06 RX ORDER — IBUPROFEN 200 MG
200 TABLET ORAL EVERY 8 HOURS PRN
COMMUNITY
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
## 2024-06-06 NOTE — ASSESSMENT & PLAN NOTE
Echo 5/14/2024 PASP 56 mmHG  RHC 5/15/2024 PA pressures 73/28/43 mmHg  Outpatient follow up with DR. Perez in the pulmonary Htn clinic.

## 2024-06-06 NOTE — ASSESSMENT & PLAN NOTE
Patient states that he was diagnosed 6-7 years ago and treated at [facility] since. He denies having ever been anticoagulated but is unaware as to why. He does have h/o ETOH ism and hepatitis C which could be the deciding factor. He has been maintained in RSR with digoxin. The patient states that he has had no episodes since first diagnosed. He denies palpitations in the last 3 years.   CHA2 DS2 VASc score is 3  HAS-BLED scores is  4  Patient may be candidate for Watchman given high risk for bleed (HAS-BLED score of 4)  Continue ASA for CVA prophylaxis until re-evaluated by Dr. Denny.

## 2024-06-11 ENCOUNTER — LAB REQUISITION (OUTPATIENT)
Dept: LAB | Facility: HOSPITAL | Age: 64
End: 2024-06-11
Attending: FAMILY MEDICINE
Payer: OTHER GOVERNMENT

## 2024-06-11 DIAGNOSIS — J06.9 ACUTE UPPER RESPIRATORY INFECTION, UNSPECIFIED: ICD-10-CM

## 2024-06-11 LAB — RSV AG SPEC QL IA: NEGATIVE

## 2024-06-11 PROCEDURE — 87634 RSV DNA/RNA AMP PROBE: CPT | Performed by: FAMILY MEDICINE

## 2024-06-25 ENCOUNTER — OFFICE VISIT (OUTPATIENT)
Dept: CARDIOLOGY | Facility: CLINIC | Age: 64
End: 2024-06-25
Payer: MEDICARE

## 2024-06-25 VITALS
OXYGEN SATURATION: 97 % | WEIGHT: 186.19 LBS | HEIGHT: 71 IN | BODY MASS INDEX: 26.06 KG/M2 | DIASTOLIC BLOOD PRESSURE: 82 MMHG | HEART RATE: 83 BPM | SYSTOLIC BLOOD PRESSURE: 124 MMHG

## 2024-06-25 DIAGNOSIS — I27.20 PULMONARY HYPERTENSION: Primary | ICD-10-CM

## 2024-06-25 PROCEDURE — 99214 OFFICE O/P EST MOD 30 MIN: CPT | Mod: PBBFAC | Performed by: HOSPITALIST

## 2024-06-25 PROCEDURE — 99999 PR PBB SHADOW E&M-EST. PATIENT-LVL IV: CPT | Mod: PBBFAC,,, | Performed by: HOSPITALIST

## 2024-06-25 PROCEDURE — 99204 OFFICE O/P NEW MOD 45 MIN: CPT | Mod: S$PBB,,, | Performed by: HOSPITALIST

## 2024-06-25 NOTE — PROGRESS NOTES
CARDIOVASCULAR CONSULTATION        REASON FOR CONSULT:   Steven Crouch is a 64 y.o. male who presents for   Chief Complaint   Patient presents with    Shortness of Breath     While coughing and with exertion    Dizziness     From excessive coughing    Loss of Consciousness     Passed out twice within the last week          HISTORY OF PRESENT ILLNESS:   64 y.o. male who  has a past medical history of A-fib, Hypertension, and Unspecified cirrhosis of liver.    Today we discussed the patient's cardiac symptoms at length. He suffers from chronic severe shortness of breath, 60py smoking hx, COPD, and PH. Today we discussed his RHC and further subcategorization of his PH as his mPAP was severely elevated and does not appear to be 2/2 high LSFP (meaning WHO Group 2 ruled out). Likewise, no evidence of VTE history, making Group 4 less likely. WHO Group 1 and 3 are not mutually exclusive, however his elevated PVR and normal RAP (4-5mm Hg) favors WHO Group 3, with or without WHO Group 1. Discussed further workup of his chronic lung disease. Patient is amenable to further lung disease workup - has a pulmonology referral in place; scheduled 7/8/24 at 1 PM. See assessment and plan.     PAST MEDICAL HISTORY:     Past Medical History:   Diagnosis Date    A-fib     Hypertension     Unspecified cirrhosis of liver        PAST SURGICAL HISTORY:     Past Surgical History:   Procedure Laterality Date    CATHETERIZATION OF BOTH LEFT AND RIGHT HEART N/A 5/15/2024    Procedure: CATHETERIZATION, HEART, BOTH LEFT AND RIGHT;  Surgeon: Jason Dean MD;  Location: [facility] CATH LAB;  Service: Cardiology;  Laterality: N/A;       ALLERGIES AND MEDICATION:     Review of patient's allergies indicates:   Allergen Reactions    Mobic [meloxicam] Other (See Comments)     Stomach pain         Medication List            Accurate as of June 25, 2024  4:47 PM. If you have any questions, ask your nurse or doctor.                CONTINUE taking  these medications      aspirin 81 MG EC tablet  Commonly known as: ECOTRIN  Take 1 tablet (81 mg total) by mouth once daily.     atorvastatin 80 MG tablet  Commonly known as: LIPITOR  Take 1 tablet (80 mg total) by mouth every evening.     digoxin 125 mcg tablet  Commonly known as: LANOXIN     hydrOXYzine HCL 25 MG tablet  Commonly known as: ATARAX     ibuprofen 200 MG tablet  Commonly known as: ADVIL,MOTRIN     LORazepam 0.5 MG tablet  Commonly known as: ATIVAN     losartan 25 MG tablet  Commonly known as: COZAAR  Take 1 tablet (25 mg total) by mouth once daily.     methocarbamoL 500 MG Tab  Commonly known as: ROBAXIN     metoprolol succinate 25 MG 24 hr tablet  Commonly known as: TOPROL-XL  Take 1 tablet (25 mg total) by mouth once daily.     nicotine 14 mg/24 hr  Commonly known as: NICODERM CQ     sertraline 100 MG tablet  Commonly known as: ZOLOFT     thiamine 100 MG tablet              SOCIAL HISTORY:     Social History     Socioeconomic History    Marital status: Single   Tobacco Use    Smoking status: Never    Smokeless tobacco: Never   Substance and Sexual Activity    Alcohol use: Yes     Alcohol/week: 6.0 standard drinks of alcohol     Types: 6 Cans of beer per week    Drug use: Never     Social Determinants of Health     Financial Resource Strain: Low Risk  (5/14/2024)    Overall Financial Resource Strain (CARDIA)     Difficulty of Paying Living Expenses: Not hard at all   Food Insecurity: Patient Unable To Answer (5/14/2024)    Hunger Vital Sign     Worried About Running Out of Food in the Last Year: Patient unable to answer     Ran Out of Food in the Last Year: Patient unable to answer   Transportation Needs: Patient Unable To Answer (5/14/2024)    TRANSPORTATION NEEDS     Transportation : Patient unable to answer   Physical Activity: Inactive (5/14/2024)    Exercise Vital Sign     Days of Exercise per Week: 0 days     Minutes of Exercise per Session: 0 min   Stress: Patient Unable To Answer (5/14/2024)  "   [employer] - Occupational Stress Questionnaire     Feeling of Stress : Patient unable to answer   Housing Stability: Unknown (5/14/2024)    Housing Stability Vital Sign     Unable to Pay for Housing in the Last Year: No     Homeless in the Last Year: Patient unable to answer       FAMILY HISTORY:     Family History   Problem Relation Name Age of Onset    Hypertension Mother      Diabetes Mother      Hypertension Father      Diabetes Father         REVIEW OF SYSTEMS:       Cardiology focused 12-point ROS otherwise unremarkable except for as mentioned in HPI and A/P.   Pertinent Positives and Negatives documented herein.       PHYSICAL EXAM:     Vitals:    06/25/24 1514   BP: 124/82   Pulse: 83    Body mass index is 25.97 kg/m².  Weight: 84.5 kg (186 lb 3.2 oz)   Height: 5' 11" (180.3 cm)     Gen: NAD  HEENT: NC/AT, MMM  Chest: normal wob  CV: RRR, no m/g/r  Ext: mild/trace edema of BLEs  Skin: no rash  Psych: AMAA  Neuro: CNGI      DATA:     Laboratory:  CBC:  Recent Labs   Lab 05/14/24  0500 05/15/24  0533 05/17/24  0521   WBC 6.43 5.58 4.77   Hemoglobin 11.2 L 10.9 L 11.2 L   Hematocrit 35.7 L 34.3 L 34.9 L   Platelet Count 101 L 100 L 96 L       CHEMISTRIES:  Recent Labs   Lab 11/03/22  0432 11/09/22  0545 11/12/22  0203 01/08/24  1332 05/13/24  1932 05/14/24  0500 05/15/24  0533 05/16/24  0439 05/17/24  0521   Glucose  --   --   --    < > 91   < > 69 L 89 86   Sodium 132 L 134 L 131 L   < > 138   < > 138 140 138   Potassium 4.6 4.1 4.3   < > 4.4   < > 4.0 4.0 4.2   BUN  --   --   --    < > 22 H   < > 29 H 22 H 16   Blood Urea Nitrogen 13 17 16  --   --   --   --   --   --    Creatinine 0.85 0.83 0.94   < > 1.38 H   < > 1.35 H 1.21 1.05   eGFR  98 98 91  --   --   --   --   --   --    Calcium 8.0 L 7.8 L 8.0 L   < > 8.8   < > 8.7 8.6 8.6   Magnesium  --   --   --   --  2.0  --   --   --   --     < > = values in this interval not displayed.       CARDIAC " "BIOMARKERS:      No results found for: "BNP"    COAGS:  Recent Labs   Lab 10/28/22  1405 10/30/22  0642 05/14/24  0255   INR 1.8 1.9 1.48       LIPIDS/LFTS:  Recent Labs   Lab 05/15/24  0533 05/16/24  0439 05/17/24  0521    H 161 H 113 H    H 128 H 101 H       No results found for: "HGBA1C"    TSH        The ASCVD Risk score (Goldy VIGIL, et al., 2019) failed to calculate for the following reasons:    The patient has a prior MI or stroke diagnosis     IMAGING  No orders to display       ASSESSMENT AND PLAN     Patient Active Problem List   Diagnosis    Atrial flutter    Essential hypertension    COPD (chronic obstructive pulmonary disease)    Cirrhosis of liver    Chronic systolic heart failure    Syncopal episodes    Type 2 myocardial infarction    LEIDY (acute kidney injury)    Other chest pain    Pulmonary hypertension    Seizure as late effect of cerebrovascular accident (CVA)    Altered mental status    Elevated troponin    Sinus philippe-tachy syndrome    Paroxysmal atrial fibrillation    Alcohol dependence with withdrawal with perceptual disturbance    Elevated LFTs    NICM (nonischemic cardiomyopathy)         No orders of the defined types were placed in this encounter.      1. Pulmonary hypertension      Problem Noted   Other Chest Pain 5/15/2024   Pulmonary Hypertension 5/15/2024    S/p R/LHC that showed non-obstructive coronary disease, severely elevated mPAP (43 mmHg), PCWP not checked, but LHC showed low-normal LVEDP, I.e. normal left sided filling pressures, which is inconsistent w/ WHO Group 2 PH. Given that his PVR is also substantially elevated, this leaves WHO Group 1 and 3 primarily, as the patient has a 60 pack year smoking history (quit 1 month ago)  -discussed further dx/tx for PH, presume predominantly WHO Group 3 given history, PVR, and normal RAP which favors WHO Group 3; are not necessarily mutually exclusive however  -no VTE history per patient, makes WHO group 4 significantly less " likely  -recommend referral to pulm clinic for further evaluation of his underlying lung disease - consider hi-res CT chest nc, PFTs, etc  -see last cath report and last echo report below    Results for orders placed during the hospital encounter of 05/13/24    Cardiac catheterization    Conclusion    The left ventricular end diastolic pressure was elevated.    The pre-procedure left ventricular end diastolic pressure was 5.    The estimated blood loss was none.    There was non-obstructive coronary artery disease..    Mild non-obstructive CAD  Normal right and left sided filling pressures with severe pulmonary HTN; RA 4mmHg, PA 73/28/43mmHg and LVEDP 5mmHg  Normal systemic flow estimation CO/CI; 4.9/5.6 by Jacob and 4.9/2.5 by TD    Plan:  Workup underlying pulmonary HTN    The procedure log was documented by Documenter: Cruzito Schaeffer and verified by Jason Dean MD.    Date: 5/15/2024  Time: 5:28 PM      Results for orders placed during the hospital encounter of 05/13/24    Echo    Interpretation Summary    Left Ventricle: The left ventricle is normal in size. Mildly increased wall thickness. There is concentric remodeling. Unable to assess wall motion. There is moderately reduced systolic function. Ejection fraction by visual approximation is 40%.    Right Ventricle: Severe right ventricular enlargement. Systolic function is severely reduced.    Left Atrium: Left atrium is mildly dilated.    Right Atrium: Right atrium is severely dilated.    Aortic Valve: The aortic valve is a trileaflet valve.    Tricuspid Valve: There is moderate to severe regurgitation with a centrally directed jet.    Pulmonic Valve: There is mild regurgitation.    Pulmonary Artery: The estimated pulmonary artery systolic pressure is 56 mmHg.    IVC/SVC: Normal venous pressure at 3 mmHg.       Seizure As Late Effect of Cerebrovascular Accident (Cva) 5/15/2024   Altered Mental Status 5/15/2024   Elevated Troponin 5/15/2024   Sinus  Fabricio-Tachy Syndrome 5/15/2024   Paroxysmal Atrial Fibrillation 5/15/2024               This note was dictated with the help of speech recognition software.  There might be un-intended errors and/or substitutions.

## (undated) DEVICE — OXISENSOR ADULT DIGIT N/S

## (undated) DEVICE — CATH DIAG JACKY 3.5 6FRX110CM

## (undated) DEVICE — BOWL STERILE LARGE 32OZ

## (undated) DEVICE — SHEATH INTRODUCER 6FR 11CM

## (undated) DEVICE — PROTECTOR ULNAR NERVE FOAM

## (undated) DEVICE — DRESSING TRANS 4X4 TEGADERM

## (undated) DEVICE — CATH SWAN GANZ STND 7FR

## (undated) DEVICE — KIT HEART ANGIO MFLD LEFT RUSH

## (undated) DEVICE — MARKER SKIN RULER AND LABEL

## (undated) DEVICE — CLIPPER BLADE MOD 4406 (CAREF)

## (undated) DEVICE — GLOVE SENSICARE PI ALOE 5.5

## (undated) DEVICE — KIT GLIDESHEATH SLEND 6FR 10CM

## (undated) DEVICE — COVER PROBE US GEL BAND

## (undated) DEVICE — SHEATH INTRODUCER 7FR 11CM

## (undated) DEVICE — CONTRAST ISOVUE 370 100ML

## (undated) DEVICE — LEADWIRE DL DC EKG 10 PIN

## (undated) DEVICE — ETCO2 NC MICROSTR FEM ST ADLT

## (undated) DEVICE — TOWEL OR DISP STRL BLUE 4/PK

## (undated) DEVICE — COVER SURG LIGHT HANDLE

## (undated) DEVICE — GLOVE BIOGEL SKINSENSE PI 7.5

## (undated) DEVICE — TR BAND REG

## (undated) DEVICE — DRAPE ANGIO BRACH 38X44IN

## (undated) DEVICE — SET IV PRIMARY

## (undated) DEVICE — Device

## (undated) DEVICE — INTRODUCER KIT MICRO 4FR

## (undated) DEVICE — SET EXTENSION CLEARLINK 2INJ

## (undated) DEVICE — DECANTER FLUID TRNSF WHITE 9IN